# Patient Record
Sex: MALE | Race: WHITE | NOT HISPANIC OR LATINO | Employment: FULL TIME | URBAN - METROPOLITAN AREA
[De-identification: names, ages, dates, MRNs, and addresses within clinical notes are randomized per-mention and may not be internally consistent; named-entity substitution may affect disease eponyms.]

---

## 2023-09-07 PROBLEM — E55.9 VITAMIN D DEFICIENCY: Status: ACTIVE | Noted: 2023-09-07

## 2023-09-07 PROBLEM — M47.22 CERVICAL SPONDYLOSIS WITH RADICULOPATHY: Status: ACTIVE | Noted: 2023-09-07

## 2023-09-07 PROBLEM — E78.2 COMBINED HYPERLIPIDEMIA: Status: ACTIVE | Noted: 2023-09-07

## 2023-09-07 PROBLEM — N40.1 BENIGN PROSTATIC HYPERPLASIA WITH LOWER URINARY TRACT SYMPTOMS: Status: ACTIVE | Noted: 2023-09-07

## 2023-09-07 PROBLEM — G56.02 LEFT CARPAL TUNNEL SYNDROME: Status: ACTIVE | Noted: 2023-09-07

## 2023-09-07 PROBLEM — G62.9 PERIPHERAL NEUROPATHY: Status: ACTIVE | Noted: 2023-09-07

## 2023-09-07 PROBLEM — M54.16 LUMBAR RADICULITIS: Status: ACTIVE | Noted: 2023-09-07

## 2023-09-07 PROBLEM — Z86.711 HX OF PULMONARY EMBOLUS: Status: ACTIVE | Noted: 2023-09-07

## 2023-09-07 PROBLEM — M51.36 LUMBAR DEGENERATIVE DISC DISEASE: Status: ACTIVE | Noted: 2023-09-07

## 2023-09-07 PROBLEM — M51.369 LUMBAR DEGENERATIVE DISC DISEASE: Status: ACTIVE | Noted: 2023-09-07

## 2023-09-07 PROBLEM — R09.81 CHRONIC NASAL CONGESTION: Status: ACTIVE | Noted: 2023-09-07

## 2023-09-07 PROBLEM — G56.01 RIGHT CARPAL TUNNEL SYNDROME: Status: ACTIVE | Noted: 2023-09-07

## 2023-09-07 PROBLEM — G47.33 OBSTRUCTIVE SLEEP APNEA SYNDROME: Status: ACTIVE | Noted: 2023-09-07

## 2023-09-07 PROBLEM — R73.01 IMPAIRED FASTING GLUCOSE: Status: ACTIVE | Noted: 2023-09-07

## 2023-09-07 PROBLEM — M96.1 LUMBAR POSTLAMINECTOMY SYNDROME: Status: ACTIVE | Noted: 2023-09-07

## 2023-09-07 PROBLEM — E53.8 VITAMIN B12 DEFICIENCY: Status: ACTIVE | Noted: 2023-09-07

## 2023-09-07 PROBLEM — R32 URINARY INCONTINENCE: Status: ACTIVE | Noted: 2023-09-07

## 2023-09-07 PROBLEM — I10 BENIGN HYPERTENSION: Status: ACTIVE | Noted: 2023-09-07

## 2023-09-07 PROBLEM — E66.9 OBESITY: Status: ACTIVE | Noted: 2023-09-07

## 2023-09-07 RX ORDER — SIMVASTATIN 10 MG/1
10 TABLET, FILM COATED ORAL EVERY EVENING
COMMUNITY
End: 2023-11-20 | Stop reason: WASHOUT

## 2023-09-07 RX ORDER — CELECOXIB 100 MG/1
CAPSULE ORAL
COMMUNITY
Start: 2019-03-01 | End: 2023-11-20 | Stop reason: WASHOUT

## 2023-09-07 RX ORDER — EZETIMIBE 10 MG/1
TABLET ORAL
COMMUNITY
Start: 2019-03-01 | End: 2023-11-20 | Stop reason: WASHOUT

## 2023-09-07 RX ORDER — TIZANIDINE HYDROCHLORIDE 4 MG/1
CAPSULE, GELATIN COATED ORAL
COMMUNITY
Start: 2019-03-01 | End: 2023-11-20 | Stop reason: WASHOUT

## 2023-09-07 RX ORDER — METFORMIN HYDROCHLORIDE 1000 MG/1
TABLET ORAL
COMMUNITY
Start: 2019-03-01 | End: 2023-11-20 | Stop reason: WASHOUT

## 2023-11-20 ENCOUNTER — OFFICE VISIT (OUTPATIENT)
Dept: PAIN MEDICINE | Facility: CLINIC | Age: 63
End: 2023-11-20
Payer: COMMERCIAL

## 2023-11-20 VITALS
DIASTOLIC BLOOD PRESSURE: 80 MMHG | BODY MASS INDEX: 31.81 KG/M2 | WEIGHT: 240 LBS | HEIGHT: 73 IN | RESPIRATION RATE: 18 BRPM | SYSTOLIC BLOOD PRESSURE: 118 MMHG | HEART RATE: 71 BPM

## 2023-11-20 DIAGNOSIS — M79.2 NEURALGIA: ICD-10-CM

## 2023-11-20 DIAGNOSIS — G57.11 MERALGIA PARESTHETICA OF RIGHT SIDE: ICD-10-CM

## 2023-11-20 DIAGNOSIS — M54.50 CHRONIC LOW BACK PAIN, UNSPECIFIED BACK PAIN LATERALITY, UNSPECIFIED WHETHER SCIATICA PRESENT: Primary | ICD-10-CM

## 2023-11-20 DIAGNOSIS — G89.29 CHRONIC LOW BACK PAIN, UNSPECIFIED BACK PAIN LATERALITY, UNSPECIFIED WHETHER SCIATICA PRESENT: Primary | ICD-10-CM

## 2023-11-20 DIAGNOSIS — E66.09 CLASS 1 OBESITY DUE TO EXCESS CALORIES WITHOUT SERIOUS COMORBIDITY WITH BODY MASS INDEX (BMI) OF 31.0 TO 31.9 IN ADULT: ICD-10-CM

## 2023-11-20 DIAGNOSIS — M96.1 POSTLAMINECTOMY SYNDROME: ICD-10-CM

## 2023-11-20 PROCEDURE — 99204 OFFICE O/P NEW MOD 45 MIN: CPT | Performed by: ANESTHESIOLOGY

## 2023-11-20 PROCEDURE — 3074F SYST BP LT 130 MM HG: CPT | Performed by: ANESTHESIOLOGY

## 2023-11-20 PROCEDURE — 99214 OFFICE O/P EST MOD 30 MIN: CPT | Performed by: ANESTHESIOLOGY

## 2023-11-20 PROCEDURE — 3008F BODY MASS INDEX DOCD: CPT | Performed by: ANESTHESIOLOGY

## 2023-11-20 PROCEDURE — 1036F TOBACCO NON-USER: CPT | Performed by: ANESTHESIOLOGY

## 2023-11-20 PROCEDURE — 99401 PREV MED CNSL INDIV APPRX 15: CPT | Performed by: ANESTHESIOLOGY

## 2023-11-20 PROCEDURE — 3079F DIAST BP 80-89 MM HG: CPT | Performed by: ANESTHESIOLOGY

## 2023-11-20 RX ORDER — LIDOCAINE 50 MG/G
OINTMENT TOPICAL EVERY 12 HOURS
COMMUNITY
Start: 2023-07-25

## 2023-11-20 RX ORDER — LIDOCAINE 50 MG/G
1 PATCH CUTANEOUS DAILY
COMMUNITY
Start: 2023-07-24

## 2023-11-20 ASSESSMENT — PAIN SCALES - GENERAL: PAINLEVEL_OUTOF10: 1

## 2023-11-20 ASSESSMENT — PAIN DESCRIPTION - DESCRIPTORS: DESCRIPTORS: SHARP;SHOOTING;PRESSURE

## 2023-11-20 ASSESSMENT — ENCOUNTER SYMPTOMS
CONSTITUTIONAL NEGATIVE: 1
WEAKNESS: 1
EYES NEGATIVE: 1
RESPIRATORY NEGATIVE: 1
ENDOCRINE NEGATIVE: 1
PSYCHIATRIC NEGATIVE: 1
CARDIOVASCULAR NEGATIVE: 1
GASTROINTESTINAL NEGATIVE: 1
BACK PAIN: 1
HEMATOLOGIC/LYMPHATIC NEGATIVE: 1
NUMBNESS: 1

## 2023-11-20 ASSESSMENT — PATIENT HEALTH QUESTIONNAIRE - PHQ9
SUM OF ALL RESPONSES TO PHQ9 QUESTIONS 1 AND 2: 0
2. FEELING DOWN, DEPRESSED OR HOPELESS: NOT AT ALL
1. LITTLE INTEREST OR PLEASURE IN DOING THINGS: NOT AT ALL

## 2023-11-20 ASSESSMENT — PAIN - FUNCTIONAL ASSESSMENT: PAIN_FUNCTIONAL_ASSESSMENT: 0-10

## 2023-11-20 NOTE — H&P (VIEW-ONLY)
PAIN MANAGEMENT NEW PATIENT OFFICE NOTE    Date of Service: 11/20/2023    SUBJECTIVE    CHIEF COMPLAINT: LBP    HISTORY OF PRESENT ILLNESS    John Delatorre is a 63 y.o. male with PMH obesity, HTN, CHON non-adherent to CPAP, pDM2 who presents as new patient referred by Maurice Espinosa DO with LB pain.    Pt describes LBP 25 y without inciting trauma/incident s/p L3-S1 lami 2000, which resolved most of his LE radicular pain. Pain radiates to L side of low back and into L testicle; notes R lateral thigh numbness with standing/walking. Pain is worst with leaning forward/bending. . Pt has tried Tylenol, NSAIDs, tizanidine, MDP, >6 w PT with minimal relief. GIA (last 2019 with good relief). Last MRI L-spine 2/20/19.    Pt denies new-onset numbness, weakness, bowel/bladder incontinence.  Pt denies recent infection, allergy to Latex/iodine/contrast. Patient is currently taking the following blood thinner(s): N/A    REVIEW OF SYSTEMS  Review of Systems   Constitutional: Negative.    HENT: Negative.     Eyes: Negative.    Respiratory: Negative.     Cardiovascular: Negative.    Gastrointestinal: Negative.    Endocrine: Negative.    Musculoskeletal:  Positive for back pain.   Skin: Negative.    Neurological:  Positive for weakness and numbness.   Hematological: Negative.    Psychiatric/Behavioral: Negative.         PAST MEDICAL HISTORY  Past Medical History:   Diagnosis Date    Personal history of other endocrine, nutritional and metabolic disease 03/01/2019    History of diabetes mellitus    Personal history of other endocrine, nutritional and metabolic disease 03/01/2019    History of hyperlipidemia     Past Surgical History:   Procedure Laterality Date    ABDOMINAL SURGERY      OTHER SURGICAL HISTORY  03/01/2019    Back surgery     Family History   Problem Relation Name Age of Onset    Hyperlipidemia Mother      Hypertension Mother      Stroke Mother          CVA's    Skin cancer Mother      Diabetes Father      Heart  "disease Father      Leukemia Father          Acute myeloid leukemia    Hypertension Sister      Hyperlipidemia Sister      Diabetes Sister      Obesity Sister         CURRENT MEDICATIONS  Current Outpatient Medications   Medication Sig Dispense Refill    lidocaine (Xylocaine) 5 % ointment Apply topically every 12 hours.      Lidoderm 5 % patch Place 1 patch on the skin once daily.       No current facility-administered medications for this visit.       ALLERGIES AND DRUG REACTIONS  Allergies   Allergen Reactions    Atorvastatin Other     Abdominal pain     Gemfibrozil Unknown    Niacin Other     Rectal bleeding     Rosuvastatin Other     Abdominal pain           OBJECTIVE  Visit Vitals  /80   Pulse 71   Resp 18   Ht 1.854 m (6' 1\")   Wt 109 kg (240 lb)   BMI 31.66 kg/m²   Smoking Status Never   BSA 2.37 m²       Last Recorded Pain Score (if available):                Physical Exam  General: Sitting in chair, NAD  Head: NCAT  Eyes: Sclera/conjunctiva clear, EOMI, PERRL  Nose/mouth: MMM  CV: Good distal pulses  Lungs: Good/equal chest excursion  Abdomen: Soft, ND  Ext: No cyanosis/edema  MSK: L-spine alignment: WNL, BL paraspinal m NTTP, L-spine ROM: full  Neuro: AAOx3   Dermatome sensation to light touch  LEFT L1 (lower pelvis/upper thigh): WNL    RIGHT L1: decreased in distribution of LFCN      LEFT L2 (upper thigh): WNL       RIGHT: L2: decreased in distribution of LFCN      LEFT L3 (medial knee): WNL       RIGHT L3: decreased      LEFT L4 (superior medial malleolus): WNL       RIGHT L4: WNL      LEFT L5 (dorsal foot): WNL       RIGHT L5: WNL      LEFT S1 (lateral foot): WNL     RIGHT S1: WNL      LEFT S2 (popliteal fossa): WNL    RIGHT S2: WNL        Motor strength  LEFT L2 (hip flexion): 5/5   RIGHT L2: 5/5  LEFT L3 (knee extension): 5/5     RIGHT L3: 5/5  LEFT L4 (dorsiflexion): 5/5     RIGHT L4: 5/5  LEFT L5 (EHL extension): 5/5     RIGHT L5: 5/5  LEFT S1 (plantarflexion): 5/5     RIGHT S1: 5/5  LEFT S2 " "(knee flexion): 5/5      RIGHT S2: 5/5    Special testing  DTR unremarkable  Seated slump test neg BL  Clonus: neg BL  Babinski: neg BL    Psych: affect nl  Skin: no rash/lesions      REVIEW OF LABORATORY DATA  I have reviewed the following lab results:  WBC   Date Value Ref Range Status   02/20/2023 6.8 4.4 - 11.3 x10E9/L Final     RBC   Date Value Ref Range Status   02/20/2023 5.42 4.50 - 5.90 x10E12/L Final     Hemoglobin   Date Value Ref Range Status   02/20/2023 15.6 13.5 - 17.5 g/dL Final     Hematocrit   Date Value Ref Range Status   02/20/2023 48.8 41.0 - 52.0 % Final     MCV   Date Value Ref Range Status   02/20/2023 90 80 - 100 fL Final     MCH   Date Value Ref Range Status   01/16/2019 29.5 26 - 34 PG Final     MCHC   Date Value Ref Range Status   02/20/2023 32.0 32.0 - 36.0 g/dL Final     RDW   Date Value Ref Range Status   02/20/2023 13.3 11.5 - 14.5 % Final     Platelets   Date Value Ref Range Status   02/20/2023 320 150 - 450 x10E9/L Final     MPV   Date Value Ref Range Status   01/16/2019 9.5 7.0 - 12.6 CU Final     Sodium   Date Value Ref Range Status   02/20/2023 140 136 - 145 mmol/L Final     Potassium   Date Value Ref Range Status   02/20/2023 4.9 3.5 - 5.3 mmol/L Final     Bicarbonate   Date Value Ref Range Status   02/20/2023 30 21 - 32 mmol/L Final     Urea Nitrogen   Date Value Ref Range Status   02/20/2023 14 6 - 23 mg/dL Final     Calcium   Date Value Ref Range Status   02/20/2023 9.9 8.6 - 10.3 mg/dL Final     No results found for: \"PROTIME\", \"PTT\", \"INR\", \"FIBRINOGEN\"      REVIEW OF RADIOLOGY   I have reviewed the following:  Radiology Studies           MRI L-spine 2/20/19:  Patient is status post L3-L4, L4-L5 and L5-S1 laminectomy. There is  minimal retrolisthesis of L3 over L4 and L5 over S1.     There is loss of disc height at several levels suggestive of  desiccation and degenerative changes. There are mild endplate  irregularities and small Schmorl's nodes at multiple levels. " The  vertebral bodies demonstrate expected height. There are chronic  degenerative marrow signal changes at multiple levels.     The lower thoracic cord is unremarkable. The conus terminates  appropriately at L1-L2.     At L5-S1, patient is status post laminectomy. There is mild bilateral  facet hypertrophy. There is mild narrowing of bilateral lateral  recesses with moderate bilateral neural foraminal narrowing.     At L4-L5, there is mild bilateral facet hypertrophy with mild  encroachment upon bilateral lateral recesses. Patient is status post  laminectomy. There is mild posterior osteophytic spurring. There is  mild-to-moderate bilateral neural foraminal narrowing.     At L3-L4, there is prominent posterior osteophytic spurring with  bilateral facet hypertrophy. Patient is status post laminectomy.  There is no central canal stenosis. There is mild to moderate right  and mild left neural foraminal narrowing.     At L2-L3, there is posterior disc bulge, asymmetric to the right with  bilateral facet and ligamentum flavum hypertrophy. There is no  overall central canal stenosis. There is mild right neural foraminal  narrowing.     At L1-L2, there is posterior disc bulge with posterior osteophytic  spurring and bilateral facet hypertrophy. There is mild overall  central canal stenosis. There is no neural foraminal narrowing..     At T12-L1, there is mild posterior disc bulge with mild bilateral  facet hypertrophy without central canal stenosis or neural foramina  narrowing.     At T11-T12, there is posterior osteophytic spurring with posterior  disc bulge and bilateral facet hypertrophy. There is mild overall  central canal stenosis without significant neural foraminal narrowing.     There is volume loss of the posterior paraspinous muscles.     Mild edema is noted within the right psoas muscle at L2-L3 level.  Minimal edema is noted within the right posterior paraspinous muscles  at L5-S1 level.      IMPRESSION:  Status post laminectomy at L3-L4 to L5-S1 levels.     Lumbar spondylosis with varying severity of neural foramina  encroachment at multiple levels as detailed.         ASSESSMENT & PLAN  John Delatorre is a 63 y.o. old male with PMH obesity, HTN, CHON non-adherent to CPAP, pDM2 who presents as new patient referred by Maurice Espinosa,  with LBP radiating to L groin assoc with R thigh numbness most c/w lumbar PLS    1) Lumbar PLS after L3-S1 lami 2000  -Relieved his BLE pain, but worsening LBP  -Refractive to yrs of conservative tx including Tylenol, NSAIDs, tizanidine, MDP, >6 w PT   -Reviewed/discussed MRI L-spine 2/20/19: L3-S1 lami with L2-3 disc bulge/LFH contributing to R NFS  -MRI L-spine to eval progression in neuraxial pathology  -Last GIA 2019 by Dr Osorio supposedly gave excellent relief. Pt interested in repeating    2) R meralgia paresthetica  -Objective numbness in distribution of R LFCN likely 2/2 obesity  -Schedule diagnostic/therapeutic R LFCNB under US    3) Obesity  Discussed pt's obesity: BMI 31.6. Discussed its potential affect on worsening chronic pain through mechanical stress as well as neuro-inflammatory chemicals. This is in addition to contribution to metabolic syndrome and overal health and perioperative risk. Counseled on wt loss strategy.        Discussed procedure risks/benefits in detail with patient. Pt meets medical necessity for procedure due to failure of conservative measures. Reviewed procedural risks including bleeding, infection, nerve damage, paralysis. Also reviewed mitigating factors such as screening for infection/blood thinner use, sterile precautions, and image-guidance when applicable. All questions answered. Pt/guardian expressed understanding and choose to proceed           Gail Chow MD  Anesthesiologist & Interventional Pain Physician   [facility] [place]  O: [phone]  F: [phone]  3:10 PM  11/20/23

## 2023-11-20 NOTE — PROGRESS NOTES
PAIN MANAGEMENT NEW PATIENT OFFICE NOTE    Date of Service: 11/20/2023    SUBJECTIVE    CHIEF COMPLAINT: LBP    HISTORY OF PRESENT ILLNESS    John Delatorre is a 63 y.o. male with PMH obesity, HTN, CHON non-adherent to CPAP, pDM2 who presents as new patient referred by Maurice Espinosa DO with LB pain.    Pt describes LBP 25 y without inciting trauma/incident s/p L3-S1 lami 2000, which resolved most of his LE radicular pain. Pain radiates to L side of low back and into L testicle; notes R lateral thigh numbness with standing/walking. Pain is worst with leaning forward/bending. . Pt has tried Tylenol, NSAIDs, tizanidine, MDP, >6 w PT with minimal relief. GIA (last 2019 with good relief). Last MRI L-spine 2/20/19.    Pt denies new-onset numbness, weakness, bowel/bladder incontinence.  Pt denies recent infection, allergy to Latex/iodine/contrast. Patient is currently taking the following blood thinner(s): N/A    REVIEW OF SYSTEMS  Review of Systems   Constitutional: Negative.    HENT: Negative.     Eyes: Negative.    Respiratory: Negative.     Cardiovascular: Negative.    Gastrointestinal: Negative.    Endocrine: Negative.    Musculoskeletal:  Positive for back pain.   Skin: Negative.    Neurological:  Positive for weakness and numbness.   Hematological: Negative.    Psychiatric/Behavioral: Negative.         PAST MEDICAL HISTORY  Past Medical History:   Diagnosis Date    Personal history of other endocrine, nutritional and metabolic disease 03/01/2019    History of diabetes mellitus    Personal history of other endocrine, nutritional and metabolic disease 03/01/2019    History of hyperlipidemia     Past Surgical History:   Procedure Laterality Date    ABDOMINAL SURGERY      OTHER SURGICAL HISTORY  03/01/2019    Back surgery     Family History   Problem Relation Name Age of Onset    Hyperlipidemia Mother      Hypertension Mother      Stroke Mother          CVA's    Skin cancer Mother      Diabetes Father      Heart  "disease Father      Leukemia Father          Acute myeloid leukemia    Hypertension Sister      Hyperlipidemia Sister      Diabetes Sister      Obesity Sister         CURRENT MEDICATIONS  Current Outpatient Medications   Medication Sig Dispense Refill    lidocaine (Xylocaine) 5 % ointment Apply topically every 12 hours.      Lidoderm 5 % patch Place 1 patch on the skin once daily.       No current facility-administered medications for this visit.       ALLERGIES AND DRUG REACTIONS  Allergies   Allergen Reactions    Atorvastatin Other     Abdominal pain     Gemfibrozil Unknown    Niacin Other     Rectal bleeding     Rosuvastatin Other     Abdominal pain           OBJECTIVE  Visit Vitals  /80   Pulse 71   Resp 18   Ht 1.854 m (6' 1\")   Wt 109 kg (240 lb)   BMI 31.66 kg/m²   Smoking Status Never   BSA 2.37 m²       Last Recorded Pain Score (if available):                Physical Exam  General: Sitting in chair, NAD  Head: NCAT  Eyes: Sclera/conjunctiva clear, EOMI, PERRL  Nose/mouth: MMM  CV: Good distal pulses  Lungs: Good/equal chest excursion  Abdomen: Soft, ND  Ext: No cyanosis/edema  MSK: L-spine alignment: WNL, BL paraspinal m NTTP, L-spine ROM: full  Neuro: AAOx3   Dermatome sensation to light touch  LEFT L1 (lower pelvis/upper thigh): WNL    RIGHT L1: decreased in distribution of LFCN      LEFT L2 (upper thigh): WNL       RIGHT: L2: decreased in distribution of LFCN      LEFT L3 (medial knee): WNL       RIGHT L3: decreased      LEFT L4 (superior medial malleolus): WNL       RIGHT L4: WNL      LEFT L5 (dorsal foot): WNL       RIGHT L5: WNL      LEFT S1 (lateral foot): WNL     RIGHT S1: WNL      LEFT S2 (popliteal fossa): WNL    RIGHT S2: WNL        Motor strength  LEFT L2 (hip flexion): 5/5   RIGHT L2: 5/5  LEFT L3 (knee extension): 5/5     RIGHT L3: 5/5  LEFT L4 (dorsiflexion): 5/5     RIGHT L4: 5/5  LEFT L5 (EHL extension): 5/5     RIGHT L5: 5/5  LEFT S1 (plantarflexion): 5/5     RIGHT S1: 5/5  LEFT S2 " "(knee flexion): 5/5      RIGHT S2: 5/5    Special testing  DTR unremarkable  Seated slump test neg BL  Clonus: neg BL  Babinski: neg BL    Psych: affect nl  Skin: no rash/lesions      REVIEW OF LABORATORY DATA  I have reviewed the following lab results:  WBC   Date Value Ref Range Status   02/20/2023 6.8 4.4 - 11.3 x10E9/L Final     RBC   Date Value Ref Range Status   02/20/2023 5.42 4.50 - 5.90 x10E12/L Final     Hemoglobin   Date Value Ref Range Status   02/20/2023 15.6 13.5 - 17.5 g/dL Final     Hematocrit   Date Value Ref Range Status   02/20/2023 48.8 41.0 - 52.0 % Final     MCV   Date Value Ref Range Status   02/20/2023 90 80 - 100 fL Final     MCH   Date Value Ref Range Status   01/16/2019 29.5 26 - 34 PG Final     MCHC   Date Value Ref Range Status   02/20/2023 32.0 32.0 - 36.0 g/dL Final     RDW   Date Value Ref Range Status   02/20/2023 13.3 11.5 - 14.5 % Final     Platelets   Date Value Ref Range Status   02/20/2023 320 150 - 450 x10E9/L Final     MPV   Date Value Ref Range Status   01/16/2019 9.5 7.0 - 12.6 CU Final     Sodium   Date Value Ref Range Status   02/20/2023 140 136 - 145 mmol/L Final     Potassium   Date Value Ref Range Status   02/20/2023 4.9 3.5 - 5.3 mmol/L Final     Bicarbonate   Date Value Ref Range Status   02/20/2023 30 21 - 32 mmol/L Final     Urea Nitrogen   Date Value Ref Range Status   02/20/2023 14 6 - 23 mg/dL Final     Calcium   Date Value Ref Range Status   02/20/2023 9.9 8.6 - 10.3 mg/dL Final     No results found for: \"PROTIME\", \"PTT\", \"INR\", \"FIBRINOGEN\"      REVIEW OF RADIOLOGY   I have reviewed the following:  Radiology Studies           MRI L-spine 2/20/19:  Patient is status post L3-L4, L4-L5 and L5-S1 laminectomy. There is  minimal retrolisthesis of L3 over L4 and L5 over S1.     There is loss of disc height at several levels suggestive of  desiccation and degenerative changes. There are mild endplate  irregularities and small Schmorl's nodes at multiple levels. " The  vertebral bodies demonstrate expected height. There are chronic  degenerative marrow signal changes at multiple levels.     The lower thoracic cord is unremarkable. The conus terminates  appropriately at L1-L2.     At L5-S1, patient is status post laminectomy. There is mild bilateral  facet hypertrophy. There is mild narrowing of bilateral lateral  recesses with moderate bilateral neural foraminal narrowing.     At L4-L5, there is mild bilateral facet hypertrophy with mild  encroachment upon bilateral lateral recesses. Patient is status post  laminectomy. There is mild posterior osteophytic spurring. There is  mild-to-moderate bilateral neural foraminal narrowing.     At L3-L4, there is prominent posterior osteophytic spurring with  bilateral facet hypertrophy. Patient is status post laminectomy.  There is no central canal stenosis. There is mild to moderate right  and mild left neural foraminal narrowing.     At L2-L3, there is posterior disc bulge, asymmetric to the right with  bilateral facet and ligamentum flavum hypertrophy. There is no  overall central canal stenosis. There is mild right neural foraminal  narrowing.     At L1-L2, there is posterior disc bulge with posterior osteophytic  spurring and bilateral facet hypertrophy. There is mild overall  central canal stenosis. There is no neural foraminal narrowing..     At T12-L1, there is mild posterior disc bulge with mild bilateral  facet hypertrophy without central canal stenosis or neural foramina  narrowing.     At T11-T12, there is posterior osteophytic spurring with posterior  disc bulge and bilateral facet hypertrophy. There is mild overall  central canal stenosis without significant neural foraminal narrowing.     There is volume loss of the posterior paraspinous muscles.     Mild edema is noted within the right psoas muscle at L2-L3 level.  Minimal edema is noted within the right posterior paraspinous muscles  at L5-S1 level.      IMPRESSION:  Status post laminectomy at L3-L4 to L5-S1 levels.     Lumbar spondylosis with varying severity of neural foramina  encroachment at multiple levels as detailed.         ASSESSMENT & PLAN  John Delatorre is a 63 y.o. old male with PMH obesity, HTN, CHON non-adherent to CPAP, pDM2 who presents as new patient referred by Maurice Espinosa,  with LBP radiating to L groin assoc with R thigh numbness most c/w lumbar PLS    1) Lumbar PLS after L3-S1 lami 2000  -Relieved his BLE pain, but worsening LBP  -Refractive to yrs of conservative tx including Tylenol, NSAIDs, tizanidine, MDP, >6 w PT   -Reviewed/discussed MRI L-spine 2/20/19: L3-S1 lami with L2-3 disc bulge/LFH contributing to R NFS  -MRI L-spine to eval progression in neuraxial pathology  -Last GIA 2019 by Dr Osorio supposedly gave excellent relief. Pt interested in repeating    2) R meralgia paresthetica  -Objective numbness in distribution of R LFCN likely 2/2 obesity  -Schedule diagnostic/therapeutic R LFCNB under US    3) Obesity  Discussed pt's obesity: BMI 31.6. Discussed its potential affect on worsening chronic pain through mechanical stress as well as neuro-inflammatory chemicals. This is in addition to contribution to metabolic syndrome and overal health and perioperative risk. Counseled on wt loss strategy.        Discussed procedure risks/benefits in detail with patient. Pt meets medical necessity for procedure due to failure of conservative measures. Reviewed procedural risks including bleeding, infection, nerve damage, paralysis. Also reviewed mitigating factors such as screening for infection/blood thinner use, sterile precautions, and image-guidance when applicable. All questions answered. Pt/guardian expressed understanding and choose to proceed           Gail Chow MD  Anesthesiologist & Interventional Pain Physician   [facility] [place]  O: [phone]  F: [phone]  3:10 PM  11/20/23

## 2023-11-20 NOTE — LETTER
November 20, 2023     Maurice Espinosa DO  [address]  [address]  [facility] [address]    Patient: John Delatorre   YOB: 1960   Date of Visit: 11/20/2023       Dear Dr. Maurice Espinosa DO:    Thank you for referring John Delatorre to me for evaluation. Below are my notes for this consultation.  If you have questions, please do not hesitate to call me. I look forward to following your patient along with you.       Sincerely,     Gail Chow MD      CC: No Recipients  ______________________________________________________________________________________    PAIN MANAGEMENT NEW PATIENT OFFICE NOTE    Date of Service: 11/20/2023    SUBJECTIVE    CHIEF COMPLAINT: LBP    HISTORY OF PRESENT ILLNESS    John Delatorre is a 63 y.o. male with PMH obesity, HTN, CHON non-adherent to CPAP, pDM2 who presents as new patient referred by Maurice Espinosa DO with LB pain.    Pt describes LBP 25 y without inciting trauma/incident s/p L3-S1 lami 2000, which resolved most of his LE radicular pain. Pain radiates to L side of low back and into L testicle; notes R lateral thigh numbness with standing/walking. Pain is worst with leaning forward/bending. . Pt has tried Tylenol, NSAIDs, tizanidine, MDP, >6 w PT with minimal relief. GIA (last 2019 with good relief). Last MRI L-spine 2/20/19.    Pt denies new-onset numbness, weakness, bowel/bladder incontinence.  Pt denies recent infection, allergy to Latex/iodine/contrast. Patient is currently taking the following blood thinner(s): N/A    REVIEW OF SYSTEMS  Review of Systems   Constitutional: Negative.    HENT: Negative.     Eyes: Negative.    Respiratory: Negative.     Cardiovascular: Negative.    Gastrointestinal: Negative.    Endocrine: Negative.    Musculoskeletal:  Positive for back pain.   Skin: Negative.    Neurological:  Positive for weakness and numbness.   Hematological: Negative.    Psychiatric/Behavioral: Negative.         PAST MEDICAL HISTORY  Past Medical History:  "  Diagnosis Date   • Personal history of other endocrine, nutritional and metabolic disease 03/01/2019    History of diabetes mellitus   • Personal history of other endocrine, nutritional and metabolic disease 03/01/2019    History of hyperlipidemia     Past Surgical History:   Procedure Laterality Date   • ABDOMINAL SURGERY     • OTHER SURGICAL HISTORY  03/01/2019    Back surgery     Family History   Problem Relation Name Age of Onset   • Hyperlipidemia Mother     • Hypertension Mother     • Stroke Mother          CVA's   • Skin cancer Mother     • Diabetes Father     • Heart disease Father     • Leukemia Father          Acute myeloid leukemia   • Hypertension Sister     • Hyperlipidemia Sister     • Diabetes Sister     • Obesity Sister         CURRENT MEDICATIONS  Current Outpatient Medications   Medication Sig Dispense Refill   • lidocaine (Xylocaine) 5 % ointment Apply topically every 12 hours.     • Lidoderm 5 % patch Place 1 patch on the skin once daily.       No current facility-administered medications for this visit.       ALLERGIES AND DRUG REACTIONS  Allergies   Allergen Reactions   • Atorvastatin Other     Abdominal pain    • Gemfibrozil Unknown   • Niacin Other     Rectal bleeding    • Rosuvastatin Other     Abdominal pain           OBJECTIVE  Visit Vitals  /80   Pulse 71   Resp 18   Ht 1.854 m (6' 1\")   Wt 109 kg (240 lb)   BMI 31.66 kg/m²   Smoking Status Never   BSA 2.37 m²       Last Recorded Pain Score (if available):                Physical Exam  General: Sitting in chair, NAD  Head: NCAT  Eyes: Sclera/conjunctiva clear, EOMI, PERRL  Nose/mouth: MMM  CV: Good distal pulses  Lungs: Good/equal chest excursion  Abdomen: Soft, ND  Ext: No cyanosis/edema  MSK: L-spine alignment: WNL, BL paraspinal m NTTP, L-spine ROM: full  Neuro: AAOx3   Dermatome sensation to light touch  LEFT L1 (lower pelvis/upper thigh): WNL    RIGHT L1: decreased in distribution of LFCN      LEFT L2 (upper thigh): WNL       " RIGHT: L2: decreased in distribution of LFCN      LEFT L3 (medial knee): WNL       RIGHT L3: decreased      LEFT L4 (superior medial malleolus): WNL       RIGHT L4: WNL      LEFT L5 (dorsal foot): WNL       RIGHT L5: WNL      LEFT S1 (lateral foot): WNL     RIGHT S1: WNL      LEFT S2 (popliteal fossa): WNL    RIGHT S2: WNL        Motor strength  LEFT L2 (hip flexion): 5/5   RIGHT L2: 5/5  LEFT L3 (knee extension): 5/5     RIGHT L3: 5/5  LEFT L4 (dorsiflexion): 5/5     RIGHT L4: 5/5  LEFT L5 (EHL extension): 5/5     RIGHT L5: 5/5  LEFT S1 (plantarflexion): 5/5     RIGHT S1: 5/5  LEFT S2 (knee flexion): 5/5      RIGHT S2: 5/5    Special testing  DTR unremarkable  Seated slump test neg BL  Clonus: neg BL  Babinski: neg BL    Psych: affect nl  Skin: no rash/lesions      REVIEW OF LABORATORY DATA  I have reviewed the following lab results:  WBC   Date Value Ref Range Status   02/20/2023 6.8 4.4 - 11.3 x10E9/L Final     RBC   Date Value Ref Range Status   02/20/2023 5.42 4.50 - 5.90 x10E12/L Final     Hemoglobin   Date Value Ref Range Status   02/20/2023 15.6 13.5 - 17.5 g/dL Final     Hematocrit   Date Value Ref Range Status   02/20/2023 48.8 41.0 - 52.0 % Final     MCV   Date Value Ref Range Status   02/20/2023 90 80 - 100 fL Final     MCH   Date Value Ref Range Status   01/16/2019 29.5 26 - 34 PG Final     MCHC   Date Value Ref Range Status   02/20/2023 32.0 32.0 - 36.0 g/dL Final     RDW   Date Value Ref Range Status   02/20/2023 13.3 11.5 - 14.5 % Final     Platelets   Date Value Ref Range Status   02/20/2023 320 150 - 450 x10E9/L Final     MPV   Date Value Ref Range Status   01/16/2019 9.5 7.0 - 12.6 CU Final     Sodium   Date Value Ref Range Status   02/20/2023 140 136 - 145 mmol/L Final     Potassium   Date Value Ref Range Status   02/20/2023 4.9 3.5 - 5.3 mmol/L Final     Bicarbonate   Date Value Ref Range Status   02/20/2023 30 21 - 32 mmol/L Final     Urea Nitrogen   Date Value Ref Range Status   02/20/2023 14  "6 - 23 mg/dL Final     Calcium   Date Value Ref Range Status   02/20/2023 9.9 8.6 - 10.3 mg/dL Final     No results found for: \"PROTIME\", \"PTT\", \"INR\", \"FIBRINOGEN\"      REVIEW OF RADIOLOGY   I have reviewed the following:  Radiology Studies           MRI L-spine 2/20/19:  Patient is status post L3-L4, L4-L5 and L5-S1 laminectomy. There is  minimal retrolisthesis of L3 over L4 and L5 over S1.     There is loss of disc height at several levels suggestive of  desiccation and degenerative changes. There are mild endplate  irregularities and small Schmorl's nodes at multiple levels. The  vertebral bodies demonstrate expected height. There are chronic  degenerative marrow signal changes at multiple levels.     The lower thoracic cord is unremarkable. The conus terminates  appropriately at L1-L2.     At L5-S1, patient is status post laminectomy. There is mild bilateral  facet hypertrophy. There is mild narrowing of bilateral lateral  recesses with moderate bilateral neural foraminal narrowing.     At L4-L5, there is mild bilateral facet hypertrophy with mild  encroachment upon bilateral lateral recesses. Patient is status post  laminectomy. There is mild posterior osteophytic spurring. There is  mild-to-moderate bilateral neural foraminal narrowing.     At L3-L4, there is prominent posterior osteophytic spurring with  bilateral facet hypertrophy. Patient is status post laminectomy.  There is no central canal stenosis. There is mild to moderate right  and mild left neural foraminal narrowing.     At L2-L3, there is posterior disc bulge, asymmetric to the right with  bilateral facet and ligamentum flavum hypertrophy. There is no  overall central canal stenosis. There is mild right neural foraminal  narrowing.     At L1-L2, there is posterior disc bulge with posterior osteophytic  spurring and bilateral facet hypertrophy. There is mild overall  central canal stenosis. There is no neural foraminal narrowing..     At T12-L1, " there is mild posterior disc bulge with mild bilateral  facet hypertrophy without central canal stenosis or neural foramina  narrowing.     At T11-T12, there is posterior osteophytic spurring with posterior  disc bulge and bilateral facet hypertrophy. There is mild overall  central canal stenosis without significant neural foraminal narrowing.     There is volume loss of the posterior paraspinous muscles.     Mild edema is noted within the right psoas muscle at L2-L3 level.  Minimal edema is noted within the right posterior paraspinous muscles  at L5-S1 level.     IMPRESSION:  Status post laminectomy at L3-L4 to L5-S1 levels.     Lumbar spondylosis with varying severity of neural foramina  encroachment at multiple levels as detailed.         ASSESSMENT & PLAN  John Delatorre is a 63 y.o. old male with PMH obesity, HTN, CHON non-adherent to CPAP, pDM2 who presents as new patient referred by Maurice Espinosa DO with LBP radiating to L groin assoc with R thigh numbness most c/w lumbar PLS    1) Lumbar PLS after L3-S1 lami 2000  -Relieved his BLE pain, but worsening LBP  -Refractive to yrs of conservative tx including Tylenol, NSAIDs, tizanidine, MDP, >6 w PT   -Reviewed/discussed MRI L-spine 2/20/19: L3-S1 lami with L2-3 disc bulge/LFH contributing to R NFS  -MRI L-spine to eval progression in neuraxial pathology  -Last GIA 2019 by Dr Osorio supposedly gave excellent relief. Pt interested in repeating    2) R meralgia paresthetica  -Objective numbness in distribution of R LFCN likely 2/2 obesity  -Schedule diagnostic/therapeutic R LFCNB under US    3) Obesity  Discussed pt's obesity: BMI 31.6. Discussed its potential affect on worsening chronic pain through mechanical stress as well as neuro-inflammatory chemicals. This is in addition to contribution to metabolic syndrome and overal health and perioperative risk. Counseled on wt loss strategy.        Discussed procedure risks/benefits in detail with patient. Pt meets  medical necessity for procedure due to failure of conservative measures. Reviewed procedural risks including bleeding, infection, nerve damage, paralysis. Also reviewed mitigating factors such as screening for infection/blood thinner use, sterile precautions, and image-guidance when applicable. All questions answered. Pt/guardian expressed understanding and choose to proceed           Gail Chow MD  Anesthesiologist & Interventional Pain Physician   [facility] [place]  O: [phone]  F: [phone]  3:10 PM  11/20/23

## 2023-11-21 ENCOUNTER — TELEPHONE (OUTPATIENT)
Dept: PAIN MEDICINE | Facility: CLINIC | Age: 63
End: 2023-11-21
Payer: COMMERCIAL

## 2023-11-30 ENCOUNTER — HOSPITAL ENCOUNTER (OUTPATIENT)
Dept: GASTROENTEROLOGY | Facility: HOSPITAL | Age: 63
Discharge: HOME | End: 2023-11-30
Payer: COMMERCIAL

## 2023-11-30 ENCOUNTER — HOSPITAL ENCOUNTER (OUTPATIENT)
Dept: RADIOLOGY | Facility: HOSPITAL | Age: 63
Discharge: HOME | End: 2023-11-30
Payer: COMMERCIAL

## 2023-11-30 ENCOUNTER — HOSPITAL ENCOUNTER (OUTPATIENT)
Dept: RADIOLOGY | Facility: HOSPITAL | Age: 63
End: 2023-11-30

## 2023-11-30 VITALS
TEMPERATURE: 96.8 F | RESPIRATION RATE: 16 BRPM | DIASTOLIC BLOOD PRESSURE: 68 MMHG | BODY MASS INDEX: 33.13 KG/M2 | HEIGHT: 73 IN | SYSTOLIC BLOOD PRESSURE: 122 MMHG | OXYGEN SATURATION: 98 % | HEART RATE: 68 BPM | WEIGHT: 250 LBS

## 2023-11-30 DIAGNOSIS — M79.2 NERVE PAIN: ICD-10-CM

## 2023-11-30 DIAGNOSIS — M79.2 NEURALGIA: ICD-10-CM

## 2023-11-30 DIAGNOSIS — G57.11 MERALGIA PARESTHETICA OF RIGHT SIDE: ICD-10-CM

## 2023-11-30 PROCEDURE — 94760 N-INVAS EAR/PLS OXIMETRY 1: CPT

## 2023-11-30 PROCEDURE — 76942 ECHO GUIDE FOR BIOPSY: CPT | Performed by: ANESTHESIOLOGY

## 2023-11-30 PROCEDURE — 64450 NJX AA&/STRD OTHER PN/BRANCH: CPT | Performed by: ANESTHESIOLOGY

## 2023-11-30 PROCEDURE — 76942 ECHO GUIDE FOR BIOPSY: CPT

## 2023-11-30 ASSESSMENT — PAIN SCALES - GENERAL
PAINLEVEL_OUTOF10: 1
PAINLEVEL_OUTOF10: 0 - NO PAIN

## 2023-11-30 ASSESSMENT — COLUMBIA-SUICIDE SEVERITY RATING SCALE - C-SSRS
2. HAVE YOU ACTUALLY HAD ANY THOUGHTS OF KILLING YOURSELF?: NO
6. HAVE YOU EVER DONE ANYTHING, STARTED TO DO ANYTHING, OR PREPARED TO DO ANYTHING TO END YOUR LIFE?: NO
1. IN THE PAST MONTH, HAVE YOU WISHED YOU WERE DEAD OR WISHED YOU COULD GO TO SLEEP AND NOT WAKE UP?: NO

## 2023-11-30 ASSESSMENT — PAIN DESCRIPTION - DESCRIPTORS: DESCRIPTORS: NUMBNESS;ACHING

## 2023-11-30 ASSESSMENT — PAIN - FUNCTIONAL ASSESSMENT: PAIN_FUNCTIONAL_ASSESSMENT: 0-10

## 2023-11-30 NOTE — INTERVAL H&P NOTE
H&P reviewed. The patient was examined and there are no changes to the H&P. Pt is a 63 y.o. male with PMH obesity, HTN, CHON non-adherent to CPAP, pDM2 who presents for diagnostic/therapeutic R LFCNB under US. Patient's pain stable and persistent from last visit.  Denies allergies to Latex, steroids, local anesthetics, or iodine/contrast. Not diabetic.        Gail Chow MD  Anesthesiologist & Interventional Pain Physician   [facility] [place]  O: [phone]  F: [phone]  10:01 AM  11/30/23

## 2023-11-30 NOTE — OP NOTE
"Procedure: right lateral femoral cutaneous nerve block    Informed consent obtained. Time-out performed. right anterior superior hip region prepped with alcohol. Ultrasound utilized to identify right lateral femoral cutaneous nerve in between fascia keyla and fascia iliaca, superior to junction of sartorius and tensor fascia keyla. 1.5\" 25 G needle inserted in-plane under ultrasound guidance until 1-2 mm from nerve. After negative aspiration, 5 ml injectate administered with adequate hydrodissection.     Injectate: 4 ml 0.5% bupivacaine + 10 mg dexamethasone    Needle withdrawn. Hemostasis achieved. Bandaid placed. Pt tolerated procedure without issue    Dispo: pt discharged home in stable condition.       Gail Chow MD  Anesthesiologist & Interventional Pain Physician   [facility] [place]  O: [phone]  F: [phone]  10:02 AM  11/30/23    "

## 2023-11-30 NOTE — DISCHARGE INSTRUCTIONS
DISCHARGEINSTRUCTIONS FOR INJECTIONS     You underwent right lateral femoral cutaneous nerve block today    Aftermost injections, it is recommended that you relax and limit your activity for the remainder of the day unless you have been told otherwise by your pain physician.  You should not drive a car, operate machinery, or make important legal decisions unless otherwise directed by your pain physician.  You may resume your normal activity, including exercise, tomorrow.      Keep a written pain diary of how much pain relief you experienced following the injection procedure and the length of time of pain relief you experienced pain relief. Following diagnostic injections like medial branch nerve blocks, sacroiliac joint blocks, stellate ganglion injections and other blocks, it is very important you record the specific amount of pain relief you experienced immediately after the injectionand how long it lasted. Your doctor will ask you for this information at your follow up visit.     For all injections, please keep the injection site dry and inspect the site for a couple of days. You may remove the Band-Aid the day of the injection at any time.     Some discomfort, bruising or slight swelling may occur at the injection site. This is not abnormal if it occurs.  If needed you may:    -Take over the counter medication such as Tylenol or Motrin.   -Apply an ice pack for 30 minutes, 2 to 3 times a day for the first 24 hours.     You may shower today; no soaking baths, hot tubs, whirlpools or swimming pools for two days.      If you are given steroids in your injection, it may take 3-5 days for the steroid medication to take effect. You may notice a worsening of your symptoms for 1-2 days after the injection. This is not abnormal.  You may use acetaminophen, ibuprofen, or prescription medication that your doctor may have prescribed for you if you need to do so.     A few common side effects of steroids include facial  flushing, sweating, restlessness, irritability,difficulty sleeping, increase in blood sugar, and increased blood pressure. If you have diabetes, please monitor your blood sugar at least once a day for at least 5 days. If you have poorly controlled high blood pressure, monitoryour blood pressure for at least 2 days and contact your primary care physician if these numbers are unusually high for you.      If you take aspirin or non-steroidal anti-inflammatory drugs (examples are Motrin, Advil, ibuprofen, Naprosyn, Voltaren, Relafen, etc.) you may restart these this evening, but stop taking it 3 days before your next appointment, unless instructed otherwiseby your physician.      You do not need to discontinue non-aspirin-containing pain medications prior to an injection (examples: Celebrex, tramadol, hydrocodone and acetaminophen).      If you take a blood thinning medication (Coumadin, Lovenox, Fragmin,Ticlid, Plavix, Pradaxa, etc.), please discuss this with your primary care physician/cardiologist and your pain physician. These medications MUST be discontinued before you can have an injection safely, without the risk of uncontrolled bleeding. If these medications are not discontinued for an appropriate period of time, you will not be able to receivean injection.      If you are taking Coumadin, please have your INR checked the morning of your procedure and bringthe result to your appointment unless otherwise instructed. If your INR is over 1.2, your injection may need to be rescheduled to avoid uncontrolled bleeding from the needle placement.     Call [facility] at [phone] between 8am-4pm Monday - Friday if you are experiencing the following:    If you received an epidural or spinal injection:    -Headache that doesnot go away with medicine, is worse when sitting or standing up, and is greatly relieved upon lying down.   -Severe pain worse than or different than your baseline pain.   -Chills  or fever (101º F or greater).   -Drainage or signs of infection at the injection site     Go directly to the Emergency Department if you are experiencing the following and received an epidural or spinal injection:   -Abrupt weakness or progressive weakness in your legs that starts after you leave the clinic.   -Abrupt severe or worsening numbness in your legs.   -Inability to urinate after the injection or loss of bowel or bladder control without the urge to defecate or urinate.     If you have a clinical question that cannot wait until your next appointment, please call [phone] between 8am-4pm Monday - Friday or send a [employer] message. We do our best to return all non-emergency messages within 24 hours, Monday - Friday. A nurse or physician will return your message.      If you need to cancel an appointment, please call the scheduling staff at [phone] during normal business hours or leave a message at least 24 hours in advance.     If you are going to be sedated for your next procedure, you MUST have responsible adult who can legally drive accompany you home. You cannot eat or drink for eight hours prior to the planned procedure if you are going to receive sedation. You may take your non-blood thinning medications with a small sip of water.

## 2023-11-30 NOTE — POST-PROCEDURE NOTE
1046-PATIENT RECEIVED FROM PROCEDURE ALERT AND ORIENTED. DENIES ANY C/O PAIN.  NO BAND-AID AT INJECTION SITE.  1050-PATIENT ABLE TO STAND AND AMBULATE. DISCHARGE INSTRUCTIONS REVIEWED WITH PATIENT.

## 2023-12-04 ENCOUNTER — OFFICE VISIT (OUTPATIENT)
Dept: PAIN MEDICINE | Facility: CLINIC | Age: 63
End: 2023-12-04
Payer: COMMERCIAL

## 2023-12-04 VITALS
HEART RATE: 70 BPM | DIASTOLIC BLOOD PRESSURE: 86 MMHG | SYSTOLIC BLOOD PRESSURE: 124 MMHG | WEIGHT: 249.6 LBS | BODY MASS INDEX: 33.08 KG/M2 | HEIGHT: 73 IN

## 2023-12-04 DIAGNOSIS — E66.09 CLASS 1 OBESITY DUE TO EXCESS CALORIES WITHOUT SERIOUS COMORBIDITY WITH BODY MASS INDEX (BMI) OF 31.0 TO 31.9 IN ADULT: Primary | ICD-10-CM

## 2023-12-04 DIAGNOSIS — Z13.29 SCREENING FOR ENDOCRINE, NUTRITIONAL, METABOLIC AND IMMUNITY DISORDER: ICD-10-CM

## 2023-12-04 DIAGNOSIS — Z13.0 SCREENING FOR ENDOCRINE, NUTRITIONAL, METABOLIC AND IMMUNITY DISORDER: ICD-10-CM

## 2023-12-04 DIAGNOSIS — Z71.3 DIETARY COUNSELING: ICD-10-CM

## 2023-12-04 DIAGNOSIS — Z13.21 SCREENING FOR ENDOCRINE, NUTRITIONAL, METABOLIC AND IMMUNITY DISORDER: ICD-10-CM

## 2023-12-04 DIAGNOSIS — Z71.82 EXERCISE COUNSELING: ICD-10-CM

## 2023-12-04 DIAGNOSIS — Z13.228 SCREENING FOR ENDOCRINE, NUTRITIONAL, METABOLIC AND IMMUNITY DISORDER: ICD-10-CM

## 2023-12-04 DIAGNOSIS — R73.03 PREDIABETES: ICD-10-CM

## 2023-12-04 DIAGNOSIS — R63.2 POLYPHAGIA: ICD-10-CM

## 2023-12-04 PROCEDURE — 99401 PREV MED CNSL INDIV APPRX 15: CPT | Performed by: NURSE PRACTITIONER

## 2023-12-04 PROCEDURE — 3008F BODY MASS INDEX DOCD: CPT | Performed by: NURSE PRACTITIONER

## 2023-12-04 PROCEDURE — 3079F DIAST BP 80-89 MM HG: CPT | Performed by: NURSE PRACTITIONER

## 2023-12-04 PROCEDURE — 1036F TOBACCO NON-USER: CPT | Performed by: NURSE PRACTITIONER

## 2023-12-04 PROCEDURE — 99215 OFFICE O/P EST HI 40 MIN: CPT | Performed by: NURSE PRACTITIONER

## 2023-12-04 PROCEDURE — 99214 OFFICE O/P EST MOD 30 MIN: CPT | Performed by: NURSE PRACTITIONER

## 2023-12-04 PROCEDURE — 3074F SYST BP LT 130 MM HG: CPT | Performed by: NURSE PRACTITIONER

## 2023-12-04 ASSESSMENT — PATIENT HEALTH QUESTIONNAIRE - PHQ9
1. LITTLE INTEREST OR PLEASURE IN DOING THINGS: NOT AT ALL
2. FEELING DOWN, DEPRESSED OR HOPELESS: NOT AT ALL
SUM OF ALL RESPONSES TO PHQ9 QUESTIONS 1 AND 2: 0

## 2023-12-04 ASSESSMENT — PAIN - FUNCTIONAL ASSESSMENT: PAIN_FUNCTIONAL_ASSESSMENT: 0-10

## 2023-12-04 ASSESSMENT — PAIN SCALES - GENERAL: PAINLEVEL_OUTOF10: 4

## 2023-12-04 NOTE — PROGRESS NOTES
"Subjective   Patient ID: John Delatorre \"Denilson\" is a 63 y.o. male who presents for Weight Management Consultation.     HPI 62 YO Male with Obesity presents for a Weight Management Consultation.     #Referring Provider: Dr. Chow    # Weight History:  Initial onset of obesity: 1985  Past Diet Attempts: Weight watchers  Diet with best results/success: no long-term success with past diet attempts.   Have not taken Prescription/OTC medications for weight loss.  Goal(s) for weight loss: to lose 50 lbs  Juice, pop or other high calorie beverages? Pop sometimes  Restaurant/Fast Food Frequency: once a week    # Anthropometric Measurements:  Highest Adult Weight: 249.4  Lowest Adult Weight: 185  Current Weight: 249.6  Current Height: 6'1\"  Waist Circumference: 48  Neck Circumference: 18    #Diet Recall:  Breakfast: none  Lunch: Spaghetti with meat balls        Dinner: jambalaya  Snack(s): banana    # Current Exercise Routine: None    Review of Systems Unless noted in the HPI all other systems have been reviewed and are negative for complaint    Objective   Physical Exam  General- No acute distress, well appearing and well nourished. Obese  Eyes Conjunctiva and lids: No erythema, swelling or discharge  Neck - Supple, no cervical lymphadenopathy.   Pulmonary - Respiratory effort: Normal respiration.   Cardiovascular - Normal rate and rhythm.  Examination of extremities for edema and/or varicosities: No peripheral edema  Abdomen: Soft, Non-tender, non-distended, no abdominal masses.   Musculoskeletal - Range of motion: normal  Skin - Skin and subcutaneous tissue: Normal without rashes or lesions.  Neurologic - Reflexes: Normal. Coordination: Normal gait   Psychiatric - Orientation to person, place, and time: Normal. Mood and affect: Normal.    Assessment/Plan   Problem List Items Addressed This Visit       Obesity - Primary     Other Visit Diagnoses       Screening for endocrine, nutritional, metabolic and immunity disorder   " "     Relevant Orders    Comprehensive Metabolic Panel    Ferritin    Hemoglobin A1C    Insulin, Fasting    Lipid Panel    Vitamin B12    Vitamin D 25-Hydroxy,Total (for eval of Vitamin D levels)    Referral to Nutrition Services    Prediabetes        Relevant Orders    Comprehensive Metabolic Panel    Ferritin    Hemoglobin A1C    Insulin, Fasting    Lipid Panel    Vitamin B12    Vitamin D 25-Hydroxy,Total (for eval of Vitamin D levels)    Referral to Nutrition Services    Dietary counseling        Exercise counseling        Polyphagia        Relevant Orders    Comprehensive Metabolic Panel    Ferritin    Hemoglobin A1C    Insulin, Fasting    Lipid Panel    Vitamin B12    Vitamin D 25-Hydroxy,Total (for eval of Vitamin D levels)    Referral to Nutrition Services          TREATMENT PLAN:  64 YO Male with Obesity here for Weight Management.  Current BMI: 32  [place] reviewed.   Informed patient the state of [place] requires 1-2 months of a \"good laury effort\" prior to the initiation of WLM.  Provided dietary and exercise recommendations; patient will implement these changes over the next 1-2 months and follow-up for a weight and BP check.   If some weight has been lost and BP is well controlled, will consider adding in antiobesity medications.   Counseled Heavily on Diet and Exercise; meal plan provided.  Will also check nutritional/metabolic labs to evaluate/screen for any abnormalities.   Risks and benefits discussed.   > 50% of time spent counseling on the importance of following recommended dietary modifications including: role of diet, low calorie and carbohydrate restrictions, limiting fast food and avoiding high sugary beverages.   Also discussed, the role of exercise with an ultimate goal of at least 250-300 minutes a week for weight loss and weight maintenance.   Patient verbalizes understanding.   Follow-up in 1-2 months.     "

## 2023-12-04 NOTE — PATIENT INSTRUCTIONS
"The Following were Discussed at your Visit Today:    # Weight Loss Medications  The state of [place] requires 1-2 months of a \"Good Kacie Effort\" prior to the initiation of anti-obesity medications.  Please implement the below diet and exercise recommendations over the next 1-2 months.   Return for a Weight and Blood Pressure Check in 1-2 months   If Blood Pressure is controlled and some weight has been lost, we can further discuss adding weight loss medications.   Please call your insurance company and ask about coverage for the following medications:   A. Qsymia  B. Contrave  C. Saxenda/Liraglutide  D. Wegovy/Semaglutide    ** You must call your insurance company BEFORE our next visit to find out if you have coverage for any anti-obesity medications. We cannot start any medications next visit without this information **    # Diet Recommendations:   Keep a food journal and log everything you eat and drink. You can use a phone applications like [employer], [employer] Complete it, a notebook, or the provided meal calendar.   Eat between 1659-9819 calories per day; meal plan provided to you this visit.  Consume less than 100 carbohydrates per day. Examples of carbohydrates include: bread, pasta, cakes, cookies, rice, cereal, fruit drinks, juice, soda and fruit.   Consume no more than 1 fruit per day.  Eat 3 meals and 1 snack. Each meal should have 3-4 oz of protein and vegetables. Limit starches.  Eat on a schedule and do not skip meals.  Meal Plan & Grocery List provided to you today.     # Exercise Recommendations:   Aim for 30 minutes per day, 5 days per week to start, with progression over several weeks to more vigorous intensity.   Emphasize increasing duration, rather than intensity initially.   Moderate-intensity physical activity includes:  * Brisk Walking  * Biking (slower than 10 MPH)  * Water Aerobics  * Vigorous housework (washing windows, vacuuming, mopping)  * Mowing the lawn (push mower)  * Gardening  * Ballroom " dancing    # Lab Work:   Labs orders are in the system.  Please go to any  facility to have these labs drawn.  Please be fasting for a minimum of 8 hours before you have labs drawn.  These labs need to be drawn before our next visit to determine what medication options we have.     # Registered Dietitian:  A referral to a Registered Dietitian has been placed.   You may call [phone] to schedule your appointment  Please be sure to see the Dietitian before our next visit.   Follow-up in 1-2 months

## 2023-12-05 ENCOUNTER — LAB (OUTPATIENT)
Dept: LAB | Facility: LAB | Age: 63
End: 2023-12-05
Payer: COMMERCIAL

## 2023-12-05 DIAGNOSIS — R73.03 PREDIABETES: ICD-10-CM

## 2023-12-05 DIAGNOSIS — Z13.29 SCREENING FOR ENDOCRINE, NUTRITIONAL, METABOLIC AND IMMUNITY DISORDER: ICD-10-CM

## 2023-12-05 DIAGNOSIS — Z13.228 SCREENING FOR ENDOCRINE, NUTRITIONAL, METABOLIC AND IMMUNITY DISORDER: ICD-10-CM

## 2023-12-05 DIAGNOSIS — Z13.21 SCREENING FOR ENDOCRINE, NUTRITIONAL, METABOLIC AND IMMUNITY DISORDER: ICD-10-CM

## 2023-12-05 DIAGNOSIS — R63.2 POLYPHAGIA: ICD-10-CM

## 2023-12-05 DIAGNOSIS — Z13.0 SCREENING FOR ENDOCRINE, NUTRITIONAL, METABOLIC AND IMMUNITY DISORDER: ICD-10-CM

## 2023-12-05 LAB
25(OH)D3 SERPL-MCNC: 19 NG/ML (ref 30–100)
ALBUMIN SERPL BCP-MCNC: 4.5 G/DL (ref 3.4–5)
ALP SERPL-CCNC: 70 U/L (ref 33–136)
ALT SERPL W P-5'-P-CCNC: 55 U/L (ref 10–52)
ANION GAP SERPL CALC-SCNC: 18 MMOL/L (ref 10–20)
AST SERPL W P-5'-P-CCNC: 38 U/L (ref 9–39)
BILIRUB SERPL-MCNC: 0.7 MG/DL (ref 0–1.2)
BUN SERPL-MCNC: 17 MG/DL (ref 6–23)
CALCIUM SERPL-MCNC: 9.5 MG/DL (ref 8.6–10.3)
CHLORIDE SERPL-SCNC: 99 MMOL/L (ref 98–107)
CHOLEST SERPL-MCNC: 261 MG/DL (ref 0–199)
CHOLESTEROL/HDL RATIO: 6.8
CO2 SERPL-SCNC: 26 MMOL/L (ref 21–32)
CREAT SERPL-MCNC: 0.75 MG/DL (ref 0.5–1.3)
EST. AVERAGE GLUCOSE BLD GHB EST-MCNC: 143 MG/DL
FERRITIN SERPL-MCNC: 594 NG/ML (ref 20–300)
GFR SERPL CREATININE-BSD FRML MDRD: >90 ML/MIN/1.73M*2
GLUCOSE SERPL-MCNC: 140 MG/DL (ref 74–99)
HBA1C MFR BLD: 6.6 %
HDLC SERPL-MCNC: 38.5 MG/DL
INSULIN P FAST SERPL-ACNC: 18 UIU/ML (ref 3–25)
LDLC SERPL CALC-MCNC: ABNORMAL MG/DL
NON HDL CHOLESTEROL: 223 MG/DL (ref 0–149)
POTASSIUM SERPL-SCNC: 4.7 MMOL/L (ref 3.5–5.3)
PROT SERPL-MCNC: 7.2 G/DL (ref 6.4–8.2)
SODIUM SERPL-SCNC: 138 MMOL/L (ref 136–145)
TRIGL SERPL-MCNC: 465 MG/DL (ref 0–149)
VIT B12 SERPL-MCNC: 399 PG/ML (ref 211–911)
VLDL: ABNORMAL

## 2023-12-05 PROCEDURE — 83525 ASSAY OF INSULIN: CPT

## 2023-12-05 PROCEDURE — 82306 VITAMIN D 25 HYDROXY: CPT

## 2023-12-05 PROCEDURE — 82607 VITAMIN B-12: CPT

## 2023-12-05 PROCEDURE — 80053 COMPREHEN METABOLIC PANEL: CPT

## 2023-12-05 PROCEDURE — 82728 ASSAY OF FERRITIN: CPT

## 2023-12-05 PROCEDURE — 80061 LIPID PANEL: CPT

## 2023-12-05 PROCEDURE — 83036 HEMOGLOBIN GLYCOSYLATED A1C: CPT

## 2023-12-05 PROCEDURE — 36415 COLL VENOUS BLD VENIPUNCTURE: CPT

## 2023-12-27 ENCOUNTER — APPOINTMENT (OUTPATIENT)
Dept: PAIN MEDICINE | Facility: CLINIC | Age: 63
End: 2023-12-27
Payer: COMMERCIAL

## 2024-01-08 PROBLEM — Z71.3 DIETARY COUNSELING: Status: ACTIVE | Noted: 2024-01-08

## 2024-01-08 PROBLEM — Z71.82 EXERCISE COUNSELING: Status: ACTIVE | Noted: 2024-01-08

## 2024-01-08 PROBLEM — E11.9 TYPE 2 DIABETES MELLITUS WITHOUT COMPLICATION, WITHOUT LONG-TERM CURRENT USE OF INSULIN (MULTI): Status: ACTIVE | Noted: 2024-01-08

## 2024-01-08 NOTE — PROGRESS NOTES
"Subjective   Patient ID: John Delatorre \"Denilson\" is a 63 y.o. male who presents for Weight Management.    HPI  64 YO Male with Obesity presents for a Weight Management Follow-up.  John was seen last month and given diet and exercise recommendations.  He was asked to implement these changes over the next 1 to 2 months and return, having lost some weight, to further discuss antiobesity medications and lab results.    Weight Hx:  Initial Weight: 249.6  Current Weight: 241  Total Loss: 8.6lbs    Diet Recall:  Breakfast: Skipped  Lunch: Veggie Creww Mein  Dinner: [place] [employer] Chowder  Snack(s): None    Current exercise routine:  None    Review of Systems Unless noted in the HPI all other systems have been reviewed and are negative for complaint    Objective   Physical Exam  General- No acute distress, well appearing and well nourished. Obese  Eyes Conjunctiva and lids: No erythema, swelling or discharge  Neck - Supple, no cervical lymphadenopathy.   Pulmonary - Respiratory effort: Normal respiration.   Cardiovascular - Normal rate and rhythm.  Examination of extremities for edema and/or varicosities: No peripheral edema  Abdomen: Soft, Non-tender, non-distended, no abdominal masses.   Musculoskeletal - Range of motion: normal  Skin - Skin and subcutaneous tissue: Normal without rashes or lesions.  Neurologic - Reflexes: Normal. Coordination: Normal gait   Psychiatric - Orientation to person, place, and time: Normal. Mood and affect: Normal.    Hemoglobin A1C   Date/Time Value Ref Range Status   2023 07:58 AM 6.6 (H) see below % Final     Ferritin   Date/Time Value Ref Range Status   2023 07:58  (H) 20 - 300 ng/mL Final     Insulin, Fasting   Date/Time Value Ref Range Status   2023 07:58 AM 18 3 - 25 uIU/mL Final     LIPIDS:  Total cholesterol: 261  Cholesterol:HDL ratio: 6.8  T    CMP:  Fasting Glucose: 140  ALT: 55    Assessment/Plan   Problem List Items Addressed This Visit       " Impaired fasting glucose    Obesity    Type 2 diabetes mellitus without complication, without long-term current use of insulin (CMS/[facility]) - Primary    Relevant Medications    semaglutide (Ozempic) 0.25 mg or 0.5 mg (2 mg/3 mL) pen injector    Dietary counseling    Exercise counseling     Other Visit Diagnoses       Elevated ferritin        Lipids abnormal        Hypertriglyceridemia        Drug-induced nausea and vomiting        Relevant Medications    ondansetron ODT (Zofran-ODT) 4 mg disintegrating tablet          TREATMENT PLAN:  62 YO Male with Obesity here for Weight Management.  Current BMI: 32  Total Loss: 8.6lbs  Labs personally reviewed with patient.   Discussed medications to assist with insulin resistance and appetite suppression.   After discussing all medication options, we decided to start Ozempic.  Patient denies personal or family hx of MTC, MENII or Pancreatitis.   Risks and benefits discussed.   > 50% of time spent counseling on the importance of following recommended dietary modifications including: role of diet, low calorie and carbohydrate restrictions, limiting fast food and avoiding high sugary beverages.   Also discussed, the role of exercise with an ultimate goal of at least 250-300 minutes a week for weight loss and weight maintenance.  Follow-up in 4 weeks.

## 2024-01-08 NOTE — PATIENT INSTRUCTIONS
"Diabetes  Your Hemoglobin A1C is 6.6%   This is in the \"diabetes\" category  Aggressive lifestyle modification focusing on weight reduction and increased physical activity is the primary therapy for the management of Diabetes.   Weight reduction is optimally achieved with a multimodality approach including diet, exercise and possible pharmacologic therapy.    Abnormal Lipid Panel:  Your Total Cholesterol Level is elevated.  Your Total Cholesterol to HDL ratio is elevated.  Your Triglycerides are elevated  This is not uncommon with insulin resistance/metabolic syndrome  Diet, exercise and weight reduction will help lower your Lipids but discussion with your PCP is always recommended.    Abnormal Ferritin:  Your ferritin level is elevated.  This can be due to metabolic syndrome and/or insulin resistance however we will monitor and if it continues to remain elevated I will refer to hematology for further evaluation.    Abnormal liver function test:  Your ALT is elevated.  This can be due to obesity and fatty liver disease.  We will continue to monitor as you lose weight and if this number remains elevated I will refer you to GI for further evaluation.    #Ozempic:  You have been prescribed Ozempic, off-label, for the treatment of Insulin Resistance and Metabolic Syndrome.  Ozempic is a Glucagon-like peptide-1 (GLP-1) receptor agonist indicated as an adjunct to a reduced calorie diet and increased physical activity for chronic weight management in adults.   This medication works by decreasing the appetite and increased feeling of fullness.   Administer Ozempic once weekly, on the same day each week, at any time of day, with or without meals.   Inject subcutaneously in the abdomen, thigh or upper arm.  The initial dose for Ozempic is 0.25 mg weekly x 4 weeks, then  Increase to 0.5mg weekly x 4 weeks then we will touch base.    Possible Side Effects Include: Nausea, Diarrhea, Vomiting, Constipation, Abdominal Pain, " Headache, Fatigue, Dyspepsia, Dizziness, Abdominal Distention, Hypoglycemia (in patients with Type II Diabetes), flatulence, gastroenteritis, and GERD.    *For Constipation--> take a fiber supplement or stool softener daily. Make sure you are drinking at least 64oz of water daily.  *For Nausea--> Eat small, frequent meals throughout the day. If nausea persists, please call the office.    Follow-up in 8 weeks

## 2024-01-09 ENCOUNTER — APPOINTMENT (OUTPATIENT)
Dept: RADIOLOGY | Facility: CLINIC | Age: 64
End: 2024-01-09
Payer: COMMERCIAL

## 2024-01-09 ENCOUNTER — OFFICE VISIT (OUTPATIENT)
Dept: PAIN MEDICINE | Facility: CLINIC | Age: 64
End: 2024-01-09
Payer: COMMERCIAL

## 2024-01-09 VITALS
WEIGHT: 241 LBS | HEART RATE: 74 BPM | HEIGHT: 73 IN | DIASTOLIC BLOOD PRESSURE: 79 MMHG | SYSTOLIC BLOOD PRESSURE: 108 MMHG | BODY MASS INDEX: 31.94 KG/M2

## 2024-01-09 DIAGNOSIS — E78.89 LIPIDS ABNORMAL: ICD-10-CM

## 2024-01-09 DIAGNOSIS — Z71.3 DIETARY COUNSELING: ICD-10-CM

## 2024-01-09 DIAGNOSIS — R79.89 ELEVATED FERRITIN: ICD-10-CM

## 2024-01-09 DIAGNOSIS — E78.1 HYPERTRIGLYCERIDEMIA: ICD-10-CM

## 2024-01-09 DIAGNOSIS — Z71.82 EXERCISE COUNSELING: ICD-10-CM

## 2024-01-09 DIAGNOSIS — T50.905A DRUG-INDUCED NAUSEA AND VOMITING: ICD-10-CM

## 2024-01-09 DIAGNOSIS — E66.09 CLASS 1 OBESITY DUE TO EXCESS CALORIES WITHOUT SERIOUS COMORBIDITY WITH BODY MASS INDEX (BMI) OF 31.0 TO 31.9 IN ADULT: ICD-10-CM

## 2024-01-09 DIAGNOSIS — E11.9 TYPE 2 DIABETES MELLITUS WITHOUT COMPLICATION, WITHOUT LONG-TERM CURRENT USE OF INSULIN (MULTI): Primary | ICD-10-CM

## 2024-01-09 DIAGNOSIS — R73.01 IMPAIRED FASTING GLUCOSE: ICD-10-CM

## 2024-01-09 DIAGNOSIS — R11.2 DRUG-INDUCED NAUSEA AND VOMITING: ICD-10-CM

## 2024-01-09 PROCEDURE — 3074F SYST BP LT 130 MM HG: CPT | Performed by: NURSE PRACTITIONER

## 2024-01-09 PROCEDURE — 99214 OFFICE O/P EST MOD 30 MIN: CPT | Performed by: NURSE PRACTITIONER

## 2024-01-09 PROCEDURE — 3008F BODY MASS INDEX DOCD: CPT | Performed by: NURSE PRACTITIONER

## 2024-01-09 PROCEDURE — 3078F DIAST BP <80 MM HG: CPT | Performed by: NURSE PRACTITIONER

## 2024-01-09 PROCEDURE — 99401 PREV MED CNSL INDIV APPRX 15: CPT | Performed by: NURSE PRACTITIONER

## 2024-01-09 PROCEDURE — 1036F TOBACCO NON-USER: CPT | Performed by: NURSE PRACTITIONER

## 2024-01-09 RX ORDER — ONDANSETRON 4 MG/1
4 TABLET, ORALLY DISINTEGRATING ORAL EVERY 8 HOURS PRN
Qty: 42 TABLET | Refills: 2 | Status: SHIPPED | OUTPATIENT
Start: 2024-01-09 | End: 2024-01-23

## 2024-01-09 RX ORDER — SEMAGLUTIDE 0.68 MG/ML
INJECTION, SOLUTION SUBCUTANEOUS
Qty: 3 ML | Refills: 1 | Status: SHIPPED | OUTPATIENT
Start: 2024-01-09 | End: 2024-04-01 | Stop reason: SDUPTHER

## 2024-01-09 RX ORDER — SEMAGLUTIDE 0.68 MG/ML
0.5 INJECTION, SOLUTION SUBCUTANEOUS
Qty: 3 ML | Refills: 1 | Status: SHIPPED | OUTPATIENT
Start: 2024-01-09 | End: 2024-01-09

## 2024-01-09 ASSESSMENT — PAIN - FUNCTIONAL ASSESSMENT: PAIN_FUNCTIONAL_ASSESSMENT: NO/DENIES PAIN

## 2024-01-11 ENCOUNTER — TELEPHONE (OUTPATIENT)
Dept: PAIN MEDICINE | Facility: CLINIC | Age: 64
End: 2024-01-11
Payer: COMMERCIAL

## 2024-01-17 ENCOUNTER — APPOINTMENT (OUTPATIENT)
Dept: PAIN MEDICINE | Facility: CLINIC | Age: 64
End: 2024-01-17
Payer: COMMERCIAL

## 2024-01-18 ENCOUNTER — APPOINTMENT (OUTPATIENT)
Dept: PAIN MEDICINE | Facility: CLINIC | Age: 64
End: 2024-01-18
Payer: COMMERCIAL

## 2024-01-19 ASSESSMENT — PROMIS GLOBAL HEALTH SCALE
RATE_PHYSICAL_HEALTH: FAIR
EMOTIONAL_PROBLEMS: SOMETIMES
RATE_AVERAGE_PAIN: 4
RATE_GENERAL_HEALTH: FAIR
CARRYOUT_SOCIAL_ACTIVITIES: FAIR
RATE_QUALITY_OF_LIFE: GOOD
RATE_SOCIAL_SATISFACTION: FAIR
RATE_MENTAL_HEALTH: FAIR
RATE_AVERAGE_FATIGUE: MODERATE
CARRYOUT_PHYSICAL_ACTIVITIES: MODERATELY

## 2024-01-25 PROBLEM — Z71.82 EXERCISE COUNSELING: Status: RESOLVED | Noted: 2024-01-08 | Resolved: 2024-01-25

## 2024-01-25 PROBLEM — Z71.3 DIETARY COUNSELING: Status: RESOLVED | Noted: 2024-01-08 | Resolved: 2024-01-25

## 2024-01-25 PROBLEM — R73.01 IMPAIRED FASTING GLUCOSE: Status: RESOLVED | Noted: 2023-09-07 | Resolved: 2024-01-25

## 2024-01-26 ENCOUNTER — OFFICE VISIT (OUTPATIENT)
Dept: PRIMARY CARE | Facility: CLINIC | Age: 64
End: 2024-01-26
Payer: COMMERCIAL

## 2024-01-26 VITALS
SYSTOLIC BLOOD PRESSURE: 120 MMHG | WEIGHT: 237.2 LBS | TEMPERATURE: 97 F | BODY MASS INDEX: 31.29 KG/M2 | HEART RATE: 84 BPM | DIASTOLIC BLOOD PRESSURE: 76 MMHG | OXYGEN SATURATION: 97 %

## 2024-01-26 DIAGNOSIS — E78.2 COMBINED HYPERLIPIDEMIA: ICD-10-CM

## 2024-01-26 DIAGNOSIS — Z00.00 ROUTINE GENERAL MEDICAL EXAMINATION AT A HEALTH CARE FACILITY: Primary | ICD-10-CM

## 2024-01-26 DIAGNOSIS — I10 BENIGN HYPERTENSION: ICD-10-CM

## 2024-01-26 DIAGNOSIS — E11.9 TYPE 2 DIABETES MELLITUS WITHOUT COMPLICATION, WITHOUT LONG-TERM CURRENT USE OF INSULIN (MULTI): ICD-10-CM

## 2024-01-26 DIAGNOSIS — F43.0 ACUTE STRESS REACTION: ICD-10-CM

## 2024-01-26 DIAGNOSIS — N40.1 BENIGN PROSTATIC HYPERPLASIA WITH LOWER URINARY TRACT SYMPTOMS, SYMPTOM DETAILS UNSPECIFIED: ICD-10-CM

## 2024-01-26 DIAGNOSIS — Z23 ENCOUNTER FOR IMMUNIZATION: ICD-10-CM

## 2024-01-26 PROCEDURE — 90686 IIV4 VACC NO PRSV 0.5 ML IM: CPT | Performed by: FAMILY MEDICINE

## 2024-01-26 PROCEDURE — 99396 PREV VISIT EST AGE 40-64: CPT | Performed by: FAMILY MEDICINE

## 2024-01-26 PROCEDURE — 3008F BODY MASS INDEX DOCD: CPT | Performed by: FAMILY MEDICINE

## 2024-01-26 PROCEDURE — 3074F SYST BP LT 130 MM HG: CPT | Performed by: FAMILY MEDICINE

## 2024-01-26 PROCEDURE — 1036F TOBACCO NON-USER: CPT | Performed by: FAMILY MEDICINE

## 2024-01-26 PROCEDURE — 90750 HZV VACC RECOMBINANT IM: CPT | Performed by: FAMILY MEDICINE

## 2024-01-26 PROCEDURE — 3078F DIAST BP <80 MM HG: CPT | Performed by: FAMILY MEDICINE

## 2024-01-26 RX ORDER — HYDROXYZINE HYDROCHLORIDE 10 MG/1
10 TABLET, FILM COATED ORAL DAILY PRN
Qty: 30 TABLET | Refills: 1 | Status: SHIPPED | OUTPATIENT
Start: 2024-01-26 | End: 2024-05-03

## 2024-01-26 ASSESSMENT — PATIENT HEALTH QUESTIONNAIRE - PHQ9
SUM OF ALL RESPONSES TO PHQ9 QUESTIONS 1 & 2: 0
1. LITTLE INTEREST OR PLEASURE IN DOING THINGS: NOT AT ALL
2. FEELING DOWN, DEPRESSED OR HOPELESS: NOT AT ALL

## 2024-01-26 ASSESSMENT — ENCOUNTER SYMPTOMS
LOSS OF SENSATION IN FEET: 0
OCCASIONAL FEELINGS OF UNSTEADINESS: 0
DEPRESSION: 0

## 2024-01-26 ASSESSMENT — PAIN SCALES - GENERAL: PAINLEVEL: 3

## 2024-01-26 ASSESSMENT — LIFESTYLE VARIABLES
HOW OFTEN DO YOU HAVE SIX OR MORE DRINKS ON ONE OCCASION: NEVER
HOW MANY STANDARD DRINKS CONTAINING ALCOHOL DO YOU HAVE ON A TYPICAL DAY: PATIENT DOES NOT DRINK
HOW OFTEN DO YOU HAVE A DRINK CONTAINING ALCOHOL: NEVER
SKIP TO QUESTIONS 9-10: 1
AUDIT-C TOTAL SCORE: 0

## 2024-01-26 NOTE — PROGRESS NOTES
Subjective   Patient ID: Denilson Delatorre is a 63 y.o. male who presents for Annual Exam.    Here for annual physical today.     Hypertension  -Patient is here for follow-up of elevated blood pressure.   -Blood pressure is well controlled.  -Cardiac symptoms: none.   -Patient denies chest pain, dyspnea, and irregular heart beat.   -Cardiologist:    Osteoarthritis/Pain Management  -History of: Post Laminectomy Syndrome  -F/U: Seeing pain management - had epidural injection.  Had MRI.  Pt has follow up with pain management  -Treatment: Epidural injection  -Past Evaluation: Mri, xrays  -Specialist: Cindy Castaneda  -Past Medications:    For DM2:  -A1c: 6.6  -Follow up:  Seeing Linda Mota - started on ozempic.  Tried intermittent fasting.  Pt is losing weight.  Appetite is decreased.  Pt has issues with overeating  -Hypoglycemic Episodes:   none  -Glucose monitoring: none    Anxiety  -F/U:  Pt has been having more panic attacks.  Pt would like to try something short acting, as needed.   -Symptoms have been gradually worsening    -She denies current suicidal and homicidal ideation.    -Current Treatment: none  -Counseling: none  -Previous treatment includes: none                  Review of Systems    Objective   /76 (BP Location: Right arm, Patient Position: Sitting)   Pulse 84   Temp 36.1 °C (97 °F) (Temporal)   Wt 108 kg (237 lb 3.2 oz)   SpO2 97%   BMI 31.29 kg/m²     Physical Exam  Vitals reviewed.   Constitutional:       General: He is not in acute distress.  Cardiovascular:      Rate and Rhythm: Normal rate and regular rhythm.   Pulmonary:      Effort: Pulmonary effort is normal.      Breath sounds: No wheezing or rhonchi.   Musculoskeletal:      Right lower leg: No edema.      Left lower leg: No edema.   Lymphadenopathy:      Cervical: No cervical adenopathy.   Neurological:      Mental Status: He is alert.         Assessment/Plan   Diagnoses and all orders for this visit:  Routine general  medical examination at a health care facility  Type 2 diabetes mellitus without complication, without long-term current use of insulin (CMS/[facility])  Benign hypertension  Combined hyperlipidemia  Benign prostatic hyperplasia with lower urinary tract symptoms, symptom details unspecified    Patient Instructions   Here for physical.  Order for PSA in computer.  Get done with Linda Mota blood work.  Up to date on colonoscopy.  Flu and shingles today.  Booster at next visit for shingles.     New onset diabetic.  Continue ozempic - increase to 0.5mg when ready.  Check feet next visit.  Routine annual eye visits.     For anxiety - trial of hydroxyzine - start 10mg.  If not helping increase to 2 tabs.      Follow up in 3 months.

## 2024-01-26 NOTE — PATIENT INSTRUCTIONS
Here for physical.  Order for PSA in computer.  Get done with Linda Mota blood work.  Up to date on colonoscopy.  Flu and shingles today.  Booster at next visit for shingles.     New onset diabetic.  Continue ozempic - increase to 0.5mg when ready.  Check feet next visit.  Routine annual eye visits.     For anxiety - trial of hydroxyzine - start 10mg.  If not helping increase to 2 tabs.      Follow up in 3 months.

## 2024-01-31 ENCOUNTER — OFFICE VISIT (OUTPATIENT)
Dept: PAIN MEDICINE | Facility: CLINIC | Age: 64
End: 2024-01-31
Payer: COMMERCIAL

## 2024-01-31 ENCOUNTER — TELEPHONE (OUTPATIENT)
Dept: PAIN MEDICINE | Facility: CLINIC | Age: 64
End: 2024-01-31
Payer: COMMERCIAL

## 2024-01-31 VITALS
DIASTOLIC BLOOD PRESSURE: 74 MMHG | BODY MASS INDEX: 31.41 KG/M2 | RESPIRATION RATE: 20 BRPM | HEIGHT: 73 IN | SYSTOLIC BLOOD PRESSURE: 108 MMHG | WEIGHT: 237 LBS | HEART RATE: 84 BPM

## 2024-01-31 DIAGNOSIS — M54.2 CERVICALGIA: ICD-10-CM

## 2024-01-31 DIAGNOSIS — M54.12 CERVICAL RADICULITIS: ICD-10-CM

## 2024-01-31 DIAGNOSIS — M54.16 LUMBAR RADICULITIS: Primary | ICD-10-CM

## 2024-01-31 DIAGNOSIS — E66.09 CLASS 1 OBESITY DUE TO EXCESS CALORIES WITHOUT SERIOUS COMORBIDITY WITH BODY MASS INDEX (BMI) OF 31.0 TO 31.9 IN ADULT: ICD-10-CM

## 2024-01-31 DIAGNOSIS — G57.11 MERALGIA PARESTHETICA OF RIGHT SIDE: ICD-10-CM

## 2024-01-31 DIAGNOSIS — M96.1 LUMBAR POSTLAMINECTOMY SYNDROME: ICD-10-CM

## 2024-01-31 PROCEDURE — 3008F BODY MASS INDEX DOCD: CPT | Performed by: ANESTHESIOLOGY

## 2024-01-31 PROCEDURE — 1036F TOBACCO NON-USER: CPT | Performed by: ANESTHESIOLOGY

## 2024-01-31 PROCEDURE — 99401 PREV MED CNSL INDIV APPRX 15: CPT | Performed by: ANESTHESIOLOGY

## 2024-01-31 PROCEDURE — 99214 OFFICE O/P EST MOD 30 MIN: CPT | Performed by: ANESTHESIOLOGY

## 2024-01-31 PROCEDURE — 3074F SYST BP LT 130 MM HG: CPT | Performed by: ANESTHESIOLOGY

## 2024-01-31 PROCEDURE — 3078F DIAST BP <80 MM HG: CPT | Performed by: ANESTHESIOLOGY

## 2024-01-31 RX ORDER — METHYLPREDNISOLONE 4 MG/1
TABLET ORAL
Qty: 21 TABLET | Refills: 0 | Status: SHIPPED | OUTPATIENT
Start: 2024-01-31 | End: 2024-02-07

## 2024-01-31 ASSESSMENT — PAIN - FUNCTIONAL ASSESSMENT: PAIN_FUNCTIONAL_ASSESSMENT: 0-10

## 2024-01-31 ASSESSMENT — PAIN SCALES - GENERAL
PAINLEVEL_OUTOF10: 3
PAINLEVEL: 3

## 2024-01-31 ASSESSMENT — PATIENT HEALTH QUESTIONNAIRE - PHQ9
SUM OF ALL RESPONSES TO PHQ9 QUESTIONS 1 AND 2: 0
1. LITTLE INTEREST OR PLEASURE IN DOING THINGS: NOT AT ALL
2. FEELING DOWN, DEPRESSED OR HOPELESS: NOT AT ALL

## 2024-01-31 ASSESSMENT — PAIN DESCRIPTION - DESCRIPTORS: DESCRIPTORS: ACHING

## 2024-01-31 NOTE — H&P (VIEW-ONLY)
PAIN MANAGEMENT FOLLOW-UP OFFICE NOTE    Date of Service: 1/31/2024    SUBJECTIVE    CHIEF COMPLAINT: LBP    HISTORY OF PRESENT ILLNESS    John Delatorre is a 63 y.o. male with PMH  obesity, HTN, CHON non-adherent to CPAP, pDM2 who presents for follow-up LB pain.    On 11/30, pt underwent R LFCNB with 100% relief (and numbness) for 3 days before R meralgia paresthetica returned to baseline.    Pt endorses ongoing LBP. Pain continues to radiate to L groin and leg. Completed MRI and would like to discuss results and next steps.     Neck pain since 2019 without inciting incident. Pain radiates to BL shoulders and is associated with BL thumb numbness. Notes left hand weakness. Pain has been refractive to rest.    Pt denies new-onset numbness, weakness, bowel/bladder incontinence.  Pt denies recent infection, allergy to Latex/iodine/contrast. Patient is currently taking the following blood thinner(s): N/A    Procedure Log:  -R LFCNB 11/30/23: 100% relief (and numbness) for 3 days    REVIEW OF SYSTEMS  Review of Systems   Constitutional: Negative.    HENT: Negative.     Eyes: Negative.    Respiratory: Negative.     Cardiovascular: Negative.    Gastrointestinal: Negative.    Endocrine: Negative.    Musculoskeletal:  Positive for back pain, myalgias and neck stiffness.   Skin: Negative.    Neurological:  Positive for weakness and numbness.   Hematological: Negative.    Psychiatric/Behavioral: Negative.         PAST MEDICAL HISTORY  Past Medical History:   Diagnosis Date    Personal history of other endocrine, nutritional and metabolic disease 03/01/2019    History of diabetes mellitus    Personal history of other endocrine, nutritional and metabolic disease 03/01/2019    History of hyperlipidemia     Past Surgical History:   Procedure Laterality Date    ABDOMINAL SURGERY      HERNIA REPAIR      OTHER SURGICAL HISTORY  03/01/2019    Back surgery    TONSILLECTOMY      US GUIDED PERCUTANEOUS PLACEMENT  11/30/2023    US GUIDED  "PERCUTANEOUS PLACEMENT 11/30/2023 [facility]     Family History   Problem Relation Name Age of Onset    Hyperlipidemia Mother      Hypertension Mother      Stroke Mother          CVA's    Skin cancer Mother      Diabetes Father      Heart disease Father      Leukemia Father          Acute myeloid leukemia    Hypertension Sister      Hyperlipidemia Sister      Diabetes Sister      Obesity Sister         CURRENT MEDICATIONS  Current Outpatient Medications   Medication Sig Dispense Refill    hydrOXYzine HCL (Atarax) 10 mg tablet Take 1 tablet (10 mg) by mouth once daily as needed for anxiety. 30 tablet 1    lidocaine (Xylocaine) 5 % ointment Apply topically every 12 hours.      Lidoderm 5 % patch Place 1 patch on the skin once daily.      semaglutide (Ozempic) 0.25 mg or 0.5 mg (2 mg/3 mL) pen injector Inject 0.25 mg under the skin every 7 days for 28 days, THEN 0.5 mg every 7 days for 28 days. 3 mL 1     No current facility-administered medications for this visit.       ALLERGIES AND DRUG REACTIONS  Allergies   Allergen Reactions    Atorvastatin Other     Abdominal pain     Gemfibrozil Unknown    Niacin Other     Rectal bleeding     Rosuvastatin Other     Abdominal pain           OBJECTIVE  Visit Vitals  /74   Pulse 84   Resp 20   Ht 1.854 m (6' 1\")   Wt 108 kg (237 lb)   BMI 31.27 kg/m²   Smoking Status Never   BSA 2.36 m²       Last Recorded Pain Score (if available):                Physical Exam  General: Sitting in chair, NAD  Head: NCAT  Eyes: Sclera/conjunctiva clear, EOMI, PERRL  Nose/mouth: MMM  CV: Good distal pulses  Lungs: Good/equal chest excursion  Abdomen: Soft, ND  Ext: No cyanosis/edema  MSK: L-spine alignment: WNL, BL paraspinal m NTTP, L-spine ROM: full   C-spine alignment: WNL, BL paraspinal m TTP, c-spine ROM: full with pain on extension. Neg Spurling, Lhermitte    Neuro: AAOx3   Dermatome sensation to light touch  LEFT C5: WNL    RIGHT C5: WNL      LEFT C6: WNL       RIGHT C6: WNL      LEFT C7: " WNL       RIGHT C7: WNL      LEFT C8: WNL       RIGHT C8: WNL      LEFT T1: WNL       RIGHT T1: WNL    LEFT L1 (lower pelvis/upper thigh): WNL    RIGHT L1: decreased in distribution of LFCN    LEFT L2 (upper thigh): WNL       RIGHT: L2:decreased in distribution of LFCN    LEFT L3 (medial knee): WNL       RIGHT L3: decreased in distribution of LFCN    LEFT L4 (superior medial malleolus): WNL       RIGHT L4: WNL      LEFT L5 (dorsal foot): WNL       RIGHT L5: WNL      LEFT S1 (lateral foot): WNL     RIGHT S1: WNL      LEFT S2 (popliteal fossa): WNL    RIGHT S2: WNL        Motor strength  LEFT C5 (elbow flexion): 5/5   RIGHT C5: 5/5  LEFT C6 (wrist extension): 5/5     RIGHT C6: 5/5  LEFT C7 (elbow extension): 5/5     RIGHT C7: 5/5  LEFT C8 (finger abduction): 5/5     RIGHT C8: 5/5  LEFT T1 (hand ): 5/5     RIGHT T1: 5/5    LEFT L2 (hip flexion): 5/5   RIGHT L2: 5/5  LEFT L3 (knee extension): 5/5     RIGHT L3: 5/5  LEFT L4 (dorsiflexion): 5/5     RIGHT L4: 5/5  LEFT L5 (EHL extension): 5/5     RIGHT L5: 5/5  LEFT S1 (plantarflexion): 5/5     RIGHT S1: 5/5  LEFT S2 (knee flexion): 5/5      RIGHT S2: 5/5    Special testing  Gill: neg BL  DTR unremarkable  Seated slump test neg BL  Clonus: neg BL  Babinski: neg BL    Psych: affect nl  Skin: no rash/lesions      REVIEW OF LABORATORY DATA  I have reviewed the following lab results:  WBC   Date Value Ref Range Status   02/20/2023 6.8 4.4 - 11.3 x10E9/L Final     RBC   Date Value Ref Range Status   02/20/2023 5.42 4.50 - 5.90 x10E12/L Final     Hemoglobin   Date Value Ref Range Status   02/20/2023 15.6 13.5 - 17.5 g/dL Final     Hematocrit   Date Value Ref Range Status   02/20/2023 48.8 41.0 - 52.0 % Final     MCV   Date Value Ref Range Status   02/20/2023 90 80 - 100 fL Final     MCH   Date Value Ref Range Status   01/16/2019 29.5 26 - 34 PG Final     MCHC   Date Value Ref Range Status   02/20/2023 32.0 32.0 - 36.0 g/dL Final     RDW   Date Value Ref Range Status  "  02/20/2023 13.3 11.5 - 14.5 % Final     Platelets   Date Value Ref Range Status   02/20/2023 320 150 - 450 x10E9/L Final     MPV   Date Value Ref Range Status   01/16/2019 9.5 7.0 - 12.6 CU Final     Sodium   Date Value Ref Range Status   12/05/2023 138 136 - 145 mmol/L Final     Potassium   Date Value Ref Range Status   12/05/2023 4.7 3.5 - 5.3 mmol/L Final     Bicarbonate   Date Value Ref Range Status   12/05/2023 26 21 - 32 mmol/L Final     Urea Nitrogen   Date Value Ref Range Status   12/05/2023 17 6 - 23 mg/dL Final     Calcium   Date Value Ref Range Status   12/05/2023 9.5 8.6 - 10.3 mg/dL Final     No results found for: \"PROTIME\", \"PTT\", \"INR\", \"FIBRINOGEN\"      REVIEW OF RADIOLOGY   I have reviewed the following:  Radiology Studies           MRI c-spine 11/11/19:  Alignment: There is straightening of the cervical spine.     Vertebrae/Intervertebral Discs: The vertebral bodies demonstrate  expected height. There is hypointense T1 and T2 signal in the dens  consistent with sclerosis due to chronic degenerative changes.  Otherwise, marrow signal is within normal limits. There is diffuse  desiccation of the intervertebral discs. Intervertebral disc heights  are maintained.     Cord: Spinal cord is normal in signal and caliber.     C1-C2: There is no central canal stenosis.     C2-C3: There is a disc osteophyte complex which partially effaces the  ventral thecal sac and causes overall mild spinal canal stenosis.  There is mild left uncovertebral hypertrophy and facet  osteoarthropathy. There is no significant narrowing of the neural  foramina. There is no right facet osteoarthropathy.     C3-C4: There is osteophyte disc complex, partially effacing the  ventral thecal sac and causing overall moderate spinal canal  stenosis. There are bilateral uncovertebral osteophytes, right  greater than left, and mild right facet osteoarthropathy. There is  resulting at most mild bilateral neural foraminal narrowing. There " is  no significant left-sided facet osteoarthropathy.     C4-C5: There is a disc osteophyte complex including a central disc  protrusion which causes marked spinal canal stenosis and indents the  ventral cord. There is mild bilateral neural foraminal narrowing due  to uncovertebral hypertrophy and facet osteoarthropathy.     C5-C6: There is moderate spinal canal stenosis due to disc osteophyte  complex. There is mild-to-moderate bilateral neural foraminal  narrowing due to uncovertebral hypertrophy and facet osteoarthropathy.     C6-C7: There is mild spinal canal stenosis due to disc osteophyte  complex. There is mild bilateral neural foraminal narrowing due to  uncovertebral hypertrophy. There is no significant facet  osteoarthropathy.     C7-T1: There is no posterior disc contour abnormality. There is no  spinal canal or neural foraminal stenosis. There is mild left facet  osteoarthropathy. There is no right facet osteoarthropathy.     There is a left paracentral disc protrusion at T2-T3 which partially  effaces the ventral thecal sac. There is no spinal canal stenosis.  The disc extends into the left neural foramen but causes no  significant neural foraminal narrowing. There is no narrowing of the  right neural foramen. There is mild left facet osteoarthropathy.     The prevertebral and posterior paraspinal soft tissues are  unremarkable.     IMPRESSION:  1. Multilevel degenerative changes in the cervical spine, most  pronounced at the level C4-C5, where there is marked spinal canal  stenosis. There is moderate spinal canal stenosis at C3-C4 and C5-C6  and mild spinal canal stenosis at C2-C3 and C6-C7. There is variable  degree of neural foraminal narrowing at multiple levels as detailed  above.           ASSESSMENT & PLAN  John Delatorre is a 63 y.o. old male with PMH obesity, HTN, CHON non-adherent to CPAP, pDM2 who presents for F/U LB pain       1) Lumbar PLS after L3-S1 lami 2000  -Relieved his BLE pain, but  worsening LBP radiating to L groin  -Refractive to yrs of conservative tx including Tylenol, NSAIDs, tizanidine, MDP, >6 w PT   -Reviewed/discussed MRI L-spine 1/18/24: stable L3-S1 lami, L2-3 disc bulge/LFH contributing to mild canal/BL NF stenosis, left mod-severe vs mild R NFS at L3-4 with disc osteophyte displacing nerve, mod BL NFS at L4-5, mod-sever eL>R NFS at L5-S1  -Last GIA 2019 by Dr Osorio supposedly gave excellent relief. Pt interested in repeating  -Schedule left L3-4, L4-5 TFESI to target pain generator as seen on imaging and minimize risk/likelihood of chronic opioid use and/or surgery  -Consider SCS if refractive     2) R meralgia paresthetica  -Objective numbness in distribution of R LFCN likely 2/2 obesity  -R LFNCB 11/30: 100% relief (and numbness) for 3 days  -Briefly discussed DRG, but pt would like to revisit at a later date    3) Neck pain  -Neck pain since 2019 radiating to BL shoulders associated with BL thumb numbness, left hand weakness without obj deficit most c/w radiculitis  -Refractive to >4 y conservative tx including rest  -MDP, PRN Tylenol, PRN ibuprofen, referral to PT    4) Obesity  Discussed pt's obesity: BMI 31.3. Discussed its potential affect on worsening chronic pain through mechanical stress as well as neuro-inflammatory chemicals. This is in addition to contribution to metabolic syndrome and overal health and perioperative risk. Seeing [facility] clinic and has lost 20 lb since initiation        Discussed procedure risks/benefits in detail with patient. Pt meets medical necessity for procedure due to failure of conservative measures. Reviewed procedural risks including bleeding, infection, nerve damage, paralysis. Also reviewed mitigating factors such as screening for infection/blood thinner use, sterile precautions, and image-guidance when applicable. All questions answered. Pt/guardian expressed understanding and choose to proceed            Gail Chow  MD  Anesthesiologist & Interventional Pain Physician   [facility]  O: [phone]  F: [phone]  1:47 PM  01/31/24

## 2024-01-31 NOTE — PROGRESS NOTES
PAIN MANAGEMENT FOLLOW-UP OFFICE NOTE    Date of Service: 1/31/2024    SUBJECTIVE    CHIEF COMPLAINT: LBP    HISTORY OF PRESENT ILLNESS    John Delatorre is a 63 y.o. male with PMH  obesity, HTN, CHON non-adherent to CPAP, pDM2 who presents for follow-up LB pain.    On 11/30, pt underwent R LFCNB with 100% relief (and numbness) for 3 days before R meralgia paresthetica returned to baseline.    Pt endorses ongoing LBP. Pain continues to radiate to L groin and leg. Completed MRI and would like to discuss results and next steps.     Neck pain since 2019 without inciting incident. Pain radiates to BL shoulders and is associated with BL thumb numbness. Notes left hand weakness. Pain has been refractive to rest.    Pt denies new-onset numbness, weakness, bowel/bladder incontinence.  Pt denies recent infection, allergy to Latex/iodine/contrast. Patient is currently taking the following blood thinner(s): N/A    Procedure Log:  -R LFCNB 11/30/23: 100% relief (and numbness) for 3 days    REVIEW OF SYSTEMS  Review of Systems   Constitutional: Negative.    HENT: Negative.     Eyes: Negative.    Respiratory: Negative.     Cardiovascular: Negative.    Gastrointestinal: Negative.    Endocrine: Negative.    Musculoskeletal:  Positive for back pain, myalgias and neck stiffness.   Skin: Negative.    Neurological:  Positive for weakness and numbness.   Hematological: Negative.    Psychiatric/Behavioral: Negative.         PAST MEDICAL HISTORY  Past Medical History:   Diagnosis Date    Personal history of other endocrine, nutritional and metabolic disease 03/01/2019    History of diabetes mellitus    Personal history of other endocrine, nutritional and metabolic disease 03/01/2019    History of hyperlipidemia     Past Surgical History:   Procedure Laterality Date    ABDOMINAL SURGERY      HERNIA REPAIR      OTHER SURGICAL HISTORY  03/01/2019    Back surgery    TONSILLECTOMY      US GUIDED PERCUTANEOUS PLACEMENT  11/30/2023    US GUIDED  "PERCUTANEOUS PLACEMENT 11/30/2023 [facility]     Family History   Problem Relation Name Age of Onset    Hyperlipidemia Mother      Hypertension Mother      Stroke Mother          CVA's    Skin cancer Mother      Diabetes Father      Heart disease Father      Leukemia Father          Acute myeloid leukemia    Hypertension Sister      Hyperlipidemia Sister      Diabetes Sister      Obesity Sister         CURRENT MEDICATIONS  Current Outpatient Medications   Medication Sig Dispense Refill    hydrOXYzine HCL (Atarax) 10 mg tablet Take 1 tablet (10 mg) by mouth once daily as needed for anxiety. 30 tablet 1    lidocaine (Xylocaine) 5 % ointment Apply topically every 12 hours.      Lidoderm 5 % patch Place 1 patch on the skin once daily.      semaglutide (Ozempic) 0.25 mg or 0.5 mg (2 mg/3 mL) pen injector Inject 0.25 mg under the skin every 7 days for 28 days, THEN 0.5 mg every 7 days for 28 days. 3 mL 1     No current facility-administered medications for this visit.       ALLERGIES AND DRUG REACTIONS  Allergies   Allergen Reactions    Atorvastatin Other     Abdominal pain     Gemfibrozil Unknown    Niacin Other     Rectal bleeding     Rosuvastatin Other     Abdominal pain           OBJECTIVE  Visit Vitals  /74   Pulse 84   Resp 20   Ht 1.854 m (6' 1\")   Wt 108 kg (237 lb)   BMI 31.27 kg/m²   Smoking Status Never   BSA 2.36 m²       Last Recorded Pain Score (if available):                Physical Exam  General: Sitting in chair, NAD  Head: NCAT  Eyes: Sclera/conjunctiva clear, EOMI, PERRL  Nose/mouth: MMM  CV: Good distal pulses  Lungs: Good/equal chest excursion  Abdomen: Soft, ND  Ext: No cyanosis/edema  MSK: L-spine alignment: WNL, BL paraspinal m NTTP, L-spine ROM: full   C-spine alignment: WNL, BL paraspinal m TTP, c-spine ROM: full with pain on extension. Neg Spurling, Lhermitte    Neuro: AAOx3   Dermatome sensation to light touch  LEFT C5: WNL    RIGHT C5: WNL      LEFT C6: WNL       RIGHT C6: WNL      LEFT C7: " WNL       RIGHT C7: WNL      LEFT C8: WNL       RIGHT C8: WNL      LEFT T1: WNL       RIGHT T1: WNL    LEFT L1 (lower pelvis/upper thigh): WNL    RIGHT L1: decreased in distribution of LFCN    LEFT L2 (upper thigh): WNL       RIGHT: L2:decreased in distribution of LFCN    LEFT L3 (medial knee): WNL       RIGHT L3: decreased in distribution of LFCN    LEFT L4 (superior medial malleolus): WNL       RIGHT L4: WNL      LEFT L5 (dorsal foot): WNL       RIGHT L5: WNL      LEFT S1 (lateral foot): WNL     RIGHT S1: WNL      LEFT S2 (popliteal fossa): WNL    RIGHT S2: WNL        Motor strength  LEFT C5 (elbow flexion): 5/5   RIGHT C5: 5/5  LEFT C6 (wrist extension): 5/5     RIGHT C6: 5/5  LEFT C7 (elbow extension): 5/5     RIGHT C7: 5/5  LEFT C8 (finger abduction): 5/5     RIGHT C8: 5/5  LEFT T1 (hand ): 5/5     RIGHT T1: 5/5    LEFT L2 (hip flexion): 5/5   RIGHT L2: 5/5  LEFT L3 (knee extension): 5/5     RIGHT L3: 5/5  LEFT L4 (dorsiflexion): 5/5     RIGHT L4: 5/5  LEFT L5 (EHL extension): 5/5     RIGHT L5: 5/5  LEFT S1 (plantarflexion): 5/5     RIGHT S1: 5/5  LEFT S2 (knee flexion): 5/5      RIGHT S2: 5/5    Special testing  Gill: neg BL  DTR unremarkable  Seated slump test neg BL  Clonus: neg BL  Babinski: neg BL    Psych: affect nl  Skin: no rash/lesions      REVIEW OF LABORATORY DATA  I have reviewed the following lab results:  WBC   Date Value Ref Range Status   02/20/2023 6.8 4.4 - 11.3 x10E9/L Final     RBC   Date Value Ref Range Status   02/20/2023 5.42 4.50 - 5.90 x10E12/L Final     Hemoglobin   Date Value Ref Range Status   02/20/2023 15.6 13.5 - 17.5 g/dL Final     Hematocrit   Date Value Ref Range Status   02/20/2023 48.8 41.0 - 52.0 % Final     MCV   Date Value Ref Range Status   02/20/2023 90 80 - 100 fL Final     MCH   Date Value Ref Range Status   01/16/2019 29.5 26 - 34 PG Final     MCHC   Date Value Ref Range Status   02/20/2023 32.0 32.0 - 36.0 g/dL Final     RDW   Date Value Ref Range Status  "  02/20/2023 13.3 11.5 - 14.5 % Final     Platelets   Date Value Ref Range Status   02/20/2023 320 150 - 450 x10E9/L Final     MPV   Date Value Ref Range Status   01/16/2019 9.5 7.0 - 12.6 CU Final     Sodium   Date Value Ref Range Status   12/05/2023 138 136 - 145 mmol/L Final     Potassium   Date Value Ref Range Status   12/05/2023 4.7 3.5 - 5.3 mmol/L Final     Bicarbonate   Date Value Ref Range Status   12/05/2023 26 21 - 32 mmol/L Final     Urea Nitrogen   Date Value Ref Range Status   12/05/2023 17 6 - 23 mg/dL Final     Calcium   Date Value Ref Range Status   12/05/2023 9.5 8.6 - 10.3 mg/dL Final     No results found for: \"PROTIME\", \"PTT\", \"INR\", \"FIBRINOGEN\"      REVIEW OF RADIOLOGY   I have reviewed the following:  Radiology Studies           MRI c-spine 11/11/19:  Alignment: There is straightening of the cervical spine.     Vertebrae/Intervertebral Discs: The vertebral bodies demonstrate  expected height. There is hypointense T1 and T2 signal in the dens  consistent with sclerosis due to chronic degenerative changes.  Otherwise, marrow signal is within normal limits. There is diffuse  desiccation of the intervertebral discs. Intervertebral disc heights  are maintained.     Cord: Spinal cord is normal in signal and caliber.     C1-C2: There is no central canal stenosis.     C2-C3: There is a disc osteophyte complex which partially effaces the  ventral thecal sac and causes overall mild spinal canal stenosis.  There is mild left uncovertebral hypertrophy and facet  osteoarthropathy. There is no significant narrowing of the neural  foramina. There is no right facet osteoarthropathy.     C3-C4: There is osteophyte disc complex, partially effacing the  ventral thecal sac and causing overall moderate spinal canal  stenosis. There are bilateral uncovertebral osteophytes, right  greater than left, and mild right facet osteoarthropathy. There is  resulting at most mild bilateral neural foraminal narrowing. There " is  no significant left-sided facet osteoarthropathy.     C4-C5: There is a disc osteophyte complex including a central disc  protrusion which causes marked spinal canal stenosis and indents the  ventral cord. There is mild bilateral neural foraminal narrowing due  to uncovertebral hypertrophy and facet osteoarthropathy.     C5-C6: There is moderate spinal canal stenosis due to disc osteophyte  complex. There is mild-to-moderate bilateral neural foraminal  narrowing due to uncovertebral hypertrophy and facet osteoarthropathy.     C6-C7: There is mild spinal canal stenosis due to disc osteophyte  complex. There is mild bilateral neural foraminal narrowing due to  uncovertebral hypertrophy. There is no significant facet  osteoarthropathy.     C7-T1: There is no posterior disc contour abnormality. There is no  spinal canal or neural foraminal stenosis. There is mild left facet  osteoarthropathy. There is no right facet osteoarthropathy.     There is a left paracentral disc protrusion at T2-T3 which partially  effaces the ventral thecal sac. There is no spinal canal stenosis.  The disc extends into the left neural foramen but causes no  significant neural foraminal narrowing. There is no narrowing of the  right neural foramen. There is mild left facet osteoarthropathy.     The prevertebral and posterior paraspinal soft tissues are  unremarkable.     IMPRESSION:  1. Multilevel degenerative changes in the cervical spine, most  pronounced at the level C4-C5, where there is marked spinal canal  stenosis. There is moderate spinal canal stenosis at C3-C4 and C5-C6  and mild spinal canal stenosis at C2-C3 and C6-C7. There is variable  degree of neural foraminal narrowing at multiple levels as detailed  above.           ASSESSMENT & PLAN  John Delatorre is a 63 y.o. old male with PMH obesity, HTN, CHON non-adherent to CPAP, pDM2 who presents for F/U LB pain       1) Lumbar PLS after L3-S1 lami 2000  -Relieved his BLE pain, but  worsening LBP radiating to L groin  -Refractive to yrs of conservative tx including Tylenol, NSAIDs, tizanidine, MDP, >6 w PT   -Reviewed/discussed MRI L-spine 1/18/24: stable L3-S1 lami, L2-3 disc bulge/LFH contributing to mild canal/BL NF stenosis, left mod-severe vs mild R NFS at L3-4 with disc osteophyte displacing nerve, mod BL NFS at L4-5, mod-sever eL>R NFS at L5-S1  -Last GIA 2019 by Dr Osorio supposedly gave excellent relief. Pt interested in repeating  -Schedule left L3-4, L4-5 TFESI to target pain generator as seen on imaging and minimize risk/likelihood of chronic opioid use and/or surgery  -Consider SCS if refractive     2) R meralgia paresthetica  -Objective numbness in distribution of R LFCN likely 2/2 obesity  -R LFNCB 11/30: 100% relief (and numbness) for 3 days  -Briefly discussed DRG, but pt would like to revisit at a later date    3) Neck pain  -Neck pain since 2019 radiating to BL shoulders associated with BL thumb numbness, left hand weakness without obj deficit most c/w radiculitis  -Refractive to >4 y conservative tx including rest  -MDP, PRN Tylenol, PRN ibuprofen, referral to PT    4) Obesity  Discussed pt's obesity: BMI 31.3. Discussed its potential affect on worsening chronic pain through mechanical stress as well as neuro-inflammatory chemicals. This is in addition to contribution to metabolic syndrome and overal health and perioperative risk. Seeing [facility] clinic and has lost 20 lb since initiation        Discussed procedure risks/benefits in detail with patient. Pt meets medical necessity for procedure due to failure of conservative measures. Reviewed procedural risks including bleeding, infection, nerve damage, paralysis. Also reviewed mitigating factors such as screening for infection/blood thinner use, sterile precautions, and image-guidance when applicable. All questions answered. Pt/guardian expressed understanding and choose to proceed            Gail Chow  MD  Anesthesiologist & Interventional Pain Physician   [facility] [place]  O: [phone]  F: [phone]  1:47 PM  01/31/24

## 2024-02-01 PROBLEM — M54.12 CERVICAL RADICULITIS: Status: ACTIVE | Noted: 2024-02-01

## 2024-02-01 ASSESSMENT — ENCOUNTER SYMPTOMS
GASTROINTESTINAL NEGATIVE: 1
EYES NEGATIVE: 1
MYALGIAS: 1
PSYCHIATRIC NEGATIVE: 1
HEMATOLOGIC/LYMPHATIC NEGATIVE: 1
WEAKNESS: 1
RESPIRATORY NEGATIVE: 1
ENDOCRINE NEGATIVE: 1
CONSTITUTIONAL NEGATIVE: 1
NECK STIFFNESS: 1
NUMBNESS: 1
CARDIOVASCULAR NEGATIVE: 1
BACK PAIN: 1

## 2024-02-08 ENCOUNTER — HOSPITAL ENCOUNTER (OUTPATIENT)
Dept: GASTROENTEROLOGY | Facility: HOSPITAL | Age: 64
Discharge: HOME | End: 2024-02-08
Payer: COMMERCIAL

## 2024-02-08 ENCOUNTER — HOSPITAL ENCOUNTER (OUTPATIENT)
Dept: RADIOLOGY | Facility: HOSPITAL | Age: 64
Discharge: HOME | End: 2024-02-08
Payer: COMMERCIAL

## 2024-02-08 VITALS
HEIGHT: 73 IN | DIASTOLIC BLOOD PRESSURE: 81 MMHG | RESPIRATION RATE: 18 BRPM | WEIGHT: 230 LBS | OXYGEN SATURATION: 100 % | BODY MASS INDEX: 30.48 KG/M2 | SYSTOLIC BLOOD PRESSURE: 112 MMHG | TEMPERATURE: 97.2 F | HEART RATE: 65 BPM

## 2024-02-08 DIAGNOSIS — M54.16 LUMBAR RADICULITIS: ICD-10-CM

## 2024-02-08 PROCEDURE — 76000 FLUOROSCOPY <1 HR PHYS/QHP: CPT

## 2024-02-08 PROCEDURE — 64483 NJX AA&/STRD TFRM EPI L/S 1: CPT | Performed by: ANESTHESIOLOGY

## 2024-02-08 PROCEDURE — 64484 NJX AA&/STRD TFRM EPI L/S EA: CPT | Performed by: ANESTHESIOLOGY

## 2024-02-08 ASSESSMENT — PAIN SCALES - GENERAL
PAINLEVEL_OUTOF10: 0 - NO PAIN
PAINLEVEL_OUTOF10: 1

## 2024-02-08 ASSESSMENT — COLUMBIA-SUICIDE SEVERITY RATING SCALE - C-SSRS
1. IN THE PAST MONTH, HAVE YOU WISHED YOU WERE DEAD OR WISHED YOU COULD GO TO SLEEP AND NOT WAKE UP?: NO
2. HAVE YOU ACTUALLY HAD ANY THOUGHTS OF KILLING YOURSELF?: NO
6. HAVE YOU EVER DONE ANYTHING, STARTED TO DO ANYTHING, OR PREPARED TO DO ANYTHING TO END YOUR LIFE?: NO

## 2024-02-08 ASSESSMENT — PAIN - FUNCTIONAL ASSESSMENT
PAIN_FUNCTIONAL_ASSESSMENT: 0-10
PAIN_FUNCTIONAL_ASSESSMENT: 0-10

## 2024-02-08 ASSESSMENT — PAIN DESCRIPTION - DESCRIPTORS: DESCRIPTORS: ACHING;DULL

## 2024-02-08 NOTE — INTERVAL H&P NOTE
H&P reviewed. The patient was examined and there are no changes to the H&P.  John Delatorre is a 63 y.o. male with PMH  obesity, HTN, CHON non-adherent to CPAP, pDM2 who presents for left L3-4, L4-5 TFESI to target pain generator as seen on imaging and minimize risk/likelihood of chronic opioid use and/or surgery.    Patient's pain stable and persistent from last visit.  Denies allergies to Latex, steroids, local anesthetics, or iodine/contrast. Denies being on blood thinners. Not diabetic.  Denies fever, chills, NS, CP, SOB, cough, N/V. Discussed procedure risks/benefits in detail with patient. Pt meets medical necessity for procedure due to failure of conservative measures. Reviewed procedural risks including bleeding, infection, nerve damage, paralysis. Also reviewed mitigating factors such as screening for infection/blood thinner use, sterile precautions, and image-guidance when applicable. All questions answered. Pt/guardian expressed understanding and choose to proceed      Gail Chow MD  Anesthesiologist & Interventional Pain Physician   [facility] [place]  O: [phone]  F: [phone]  9:37 AM  02/08/24

## 2024-02-08 NOTE — OP NOTE
Procedure: RIGHT L3-4, L4-5 transforaminal epidural steroid injection  [facility]     Initially planned left-sided approach, but due to persistent vascular uptake and as cannot perform ILESI 2/2 lami, right side injected instead with adequate epidural spread. Relayed to patient. Consider repeating injection on left side in future if relief inadequate.    Informed consent completed.  Patient brought to procedure suite and positioned on table prone.  Lower back prepped with chlorhexidine and draped in sterile fashion.  Fluoroscopy utilized to identify L3-4, L4-5 levels.  C-arm was obliqued to left side to visualize L3-4, L4-5 foramina.  1.5 inch 25-gauge needle used to administer 1% lidocaine for local anesthesia.  Then, 3.5 inch 22-gauge needle were advanced coaxially to superolateral aspect of foramina via x-ray guidance.  AP view utilized to visualize needles approaching junction of the transverse process and vertebral body.  Lateral view taken to visualize needles just posterior to spinal canal.  Needle was then advanced slightly and after negative add aspiration, 1.5 cc Omnipaque 240 administered at each level revealing adequate epidural spread without vascular uptake.  AP view taken to confirm epidural spread medially without vascular uptake.  Then, 7.5 mg dexamethasone +1 cc preservative-free saline injected at each level.       Total injectate: 15 mg dexamethasone, 2 ml PF NS     Needles re-styleted and removed.  Hemostasis achieved.  Band-Aids placed.  Patient tolerated procedure well and without issue.  They were discharged home in stable condition.      Gail Chow MD  Anesthesiologist & Interventional Pain Physician   [facility] [place]  O: [phone]  F: [phone]  9:38 AM  02/08/24

## 2024-02-08 NOTE — DISCHARGE INSTRUCTIONS
DISCHARGE INSTRUCTIONS FOR INJECTIONS     You underwent an epidural steroid injection today    Aftermost injections, it is recommended that you relax and limit your activity for the remainder of the day unless you have been told otherwise by your pain physician.  You should not drive a car, operate machinery, or make important legal decisions unless otherwise directed by your pain physician.  You may resume your normal activity, including exercise, tomorrow.      Keep a written pain diary of how much pain relief you experienced following the injection procedure and the length of time of pain relief you experienced pain relief. Following diagnostic injections like medial branch nerve blocks, sacroiliac joint blocks, stellate ganglion injections and other blocks, it is very important you record the specific amount of pain relief you experienced immediately after the injectionand how long it lasted. Your doctor will ask you for this information at your follow up visit.     For all injections, please keep the injection site dry and inspect the site for a couple of days. You may remove the Band-Aid the day of the injection at any time.     Some discomfort, bruising or slight swelling may occur at the injection site. This is not abnormal if it occurs.  If needed you may:    -Take over the counter medication such as Tylenol or Motrin.   -Apply an ice pack for 30 minutes, 2 to 3 times a day for the first 24 hours.     You may shower today; no soaking baths, hot tubs, whirlpools or swimming pools for two days.      If you are given steroids in your injection, it may take 3-5 days for the steroid medication to take effect. You may notice a worsening of your symptoms for 1-2 days after the injection. This is not abnormal.  You may use acetaminophen, ibuprofen, or prescription medication that your doctor may have prescribed for you if you need to do so.     A few common side effects of steroids include facial flushing,  sweating, restlessness, irritability,difficulty sleeping, increase in blood sugar, and increased blood pressure. If you have diabetes, please monitor your blood sugar at least once a day for at least 5 days. If you have poorly controlled high blood pressure, monitoryour blood pressure for at least 2 days and contact your primary care physician if these numbers are unusually high for you.      If you take aspirin or non-steroidal anti-inflammatory drugs (examples are Motrin, Advil, ibuprofen, Naprosyn, Voltaren, Relafen, etc.) you may restart these this evening, but stop taking it 3 days before your next appointment, unless instructed otherwiseby your physician.      You do not need to discontinue non-aspirin-containing pain medications prior to an injection (examples: Celebrex, tramadol, hydrocodone and acetaminophen).      If you take a blood thinning medication (Coumadin, Lovenox, Fragmin,Ticlid, Plavix, Pradaxa, etc.), please discuss this with your primary care physician/cardiologist and your pain physician. These medications MUST be discontinued before you can have an injection safely, without the risk of uncontrolled bleeding. If these medications are not discontinued for an appropriate period of time, you will not be able to receivean injection.      If you are taking Coumadin, please have your INR checked the morning of your procedure and bringthe result to your appointment unless otherwise instructed. If your INR is over 1.2, your injection may need to be rescheduled to avoid uncontrolled bleeding from the needle placement.     Call [facility] at [phone] between 8am-4pm Monday - Friday if you are experiencing the following:    If you received an epidural or spinal injection:    -Headache that doesnot go away with medicine, is worse when sitting or standing up, and is greatly relieved upon lying down.   -Severe pain worse than or different than your baseline pain.   -Chills or fever  (101º F or greater).   -Drainage or signs of infection at the injection site     Go directly to the Emergency Department if you are experiencing the following and received an epidural or spinal injection:   -Abrupt weakness or progressive weakness in your legs that starts after you leave the clinic.   -Abrupt severe or worsening numbness in your legs.   -Inability to urinate after the injection or loss of bowel or bladder control without the urge to defecate or urinate.     If you have a clinical question that cannot wait until your next appointment, please call [phone] between 8am-4pm Monday - Friday or send a [employer] message. We do our best to return all non-emergency messages within 24 hours, Monday - Friday. A nurse or physician will return your message.      If you need to cancel an appointment, please call the scheduling staff at [phone] during normal business hours or leave a message at least 24 hours in advance.     If you are going to be sedated for your next procedure, you MUST have responsible adult who can legally drive accompany you home. You cannot eat or drink for eight hours prior to the planned procedure if you are going to receive sedation. You may take your non-blood thinning medications with a small sip of water.

## 2024-03-04 PROBLEM — Z71.82 EXERCISE COUNSELING: Status: ACTIVE | Noted: 2024-03-04

## 2024-03-04 PROBLEM — R63.2 POLYPHAGIA: Status: ACTIVE | Noted: 2024-03-04

## 2024-03-04 PROBLEM — Z71.3 DIETARY COUNSELING: Status: ACTIVE | Noted: 2024-03-04

## 2024-03-04 NOTE — PROGRESS NOTES
"Subjective   Patient ID: John Delatorre \"Denilson\" is a 63 y.o. male who presents for Weight Management.    An interactive audio and video telecommunication system which permits real time communications between the patient (at the originating site) and provider (at the distant site) was utilized to provide this telehealth service.   Verbal consent was requested and obtained from John Delatorre on this date, 03/04/24 for a telehealth visit.     HPI 64 YO Male with Obesity presents for a Weight Management Follow-up. John was seen last month and and started on Ozempic for the treatment of Prediabetes, Metabolic Syndrome and Obesity. He is currently on 0.5mg weekly. He denies N/V/D/C or abdominal pain. 1500 calories per day.      Weight Hx:  Initial Weight: 249.6  Last Visit Weight: 241  Current Weight: 231  Total Loss: 18.6lbs     Diet Recall:  Breakfast: Protein Shake  Lunch: Lean Cuisine   Dinner: Chicken, Veggie   Snack(s): None      Current exercise routine: Walking; tracking with fit bit.     Review of Systems Unless noted in the HPI all other systems have been reviewed and are negative for complaint    Objective   Physical Exam Telehealth Visit  General- No acute distress, well appearing and well nourished.   Eyes Conjunctiva and lids: No erythema, swelling or discharge  Pulmonary - Respiratory effort: Normal respiration.   Neurologic - Coordination: Normal. A&Ox3.  Psychiatric - Orientation to person, place, and time: Normal. Mood and affect: Normal.    Assessment/Plan       TREATMENT PLAN:  64 YO Male with Obesity here for Weight Management.  Current BMI: 30  Total Loss: 18.6lbs  Just took his first injection of 0.5mg Ozempic.   Will hold off on any additional RX's until he completes 4 weeks at 0.5mg. Will follow-up in 4 weeks to reassess.   Patient denies personal or family hx of MTC, MENII or Pancreatitis.   Risks and benefits discussed.   > 50% of time spent counseling on the importance of following recommended " dietary modifications including: role of diet, low calorie and carbohydrate restrictions, limiting fast food and avoiding high sugary beverages.   Also discussed, the role of exercise with an ultimate goal of at least 250-300 minutes a week for weight loss and weight maintenance.  Follow-up in 4 weeks.

## 2024-03-05 ENCOUNTER — TELEMEDICINE (OUTPATIENT)
Dept: PAIN MEDICINE | Facility: CLINIC | Age: 64
End: 2024-03-05
Payer: COMMERCIAL

## 2024-03-05 VITALS — WEIGHT: 231 LBS | HEIGHT: 73 IN | BODY MASS INDEX: 30.62 KG/M2

## 2024-03-05 DIAGNOSIS — Z71.3 DIETARY COUNSELING: ICD-10-CM

## 2024-03-05 DIAGNOSIS — Z71.82 EXERCISE COUNSELING: ICD-10-CM

## 2024-03-05 DIAGNOSIS — E11.9 TYPE 2 DIABETES MELLITUS WITHOUT COMPLICATION, WITHOUT LONG-TERM CURRENT USE OF INSULIN (MULTI): ICD-10-CM

## 2024-03-05 DIAGNOSIS — R63.2 POLYPHAGIA: ICD-10-CM

## 2024-03-05 DIAGNOSIS — E66.09 CLASS 1 OBESITY DUE TO EXCESS CALORIES WITHOUT SERIOUS COMORBIDITY WITH BODY MASS INDEX (BMI) OF 30.0 TO 30.9 IN ADULT: Primary | ICD-10-CM

## 2024-03-05 PROCEDURE — 1036F TOBACCO NON-USER: CPT | Performed by: NURSE PRACTITIONER

## 2024-03-05 PROCEDURE — 3008F BODY MASS INDEX DOCD: CPT | Performed by: NURSE PRACTITIONER

## 2024-03-05 PROCEDURE — 99214 OFFICE O/P EST MOD 30 MIN: CPT | Performed by: NURSE PRACTITIONER

## 2024-03-05 PROCEDURE — 99401 PREV MED CNSL INDIV APPRX 15: CPT | Performed by: NURSE PRACTITIONER

## 2024-03-05 ASSESSMENT — PATIENT HEALTH QUESTIONNAIRE - PHQ9
1. LITTLE INTEREST OR PLEASURE IN DOING THINGS: NOT AT ALL
SUM OF ALL RESPONSES TO PHQ9 QUESTIONS 1 AND 2: 0
2. FEELING DOWN, DEPRESSED OR HOPELESS: NOT AT ALL

## 2024-03-05 ASSESSMENT — PAIN - FUNCTIONAL ASSESSMENT: PAIN_FUNCTIONAL_ASSESSMENT: NO/DENIES PAIN

## 2024-03-05 NOTE — PATIENT INSTRUCTIONS
Medication:   You have been prescribed Ozempic, off-label, for the treatment of Insulin Resistance and Metabolic Syndrome.  Ozempic is a Glucagon-like peptide-1 (GLP-1) receptor agonist indicated as an adjunct to a reduced calorie diet and increased physical activity for chronic weight management in adults.   This medication works by decreasing the appetite and increased feeling of fullness.   Administer Ozempic once weekly, on the same day each week, at any time of day, with or without meals.   Inject subcutaneously in the abdomen, thigh or upper arm.  The initial dose for Ozempic is 0.25 mg weekly x 4 weeks, then  Increase to 0.5mg weekly x 4 weeks then we will touch base.   Possible Side Effects Include: Nausea, Diarrhea, Vomiting, Constipation, Abdominal Pain, Headache, Fatigue, Dyspepsia, Dizziness, Abdominal Distention, Hypoglycemia (in patients with Type II Diabetes), flatulence, gastroenteritis, and GERD.    *For Constipation--> take a fiber supplement or stool softener daily. Make sure you are drinking at least 64oz of water daily.  *For Nausea--> Eat small, frequent meals throughout the day. If nausea persists, please call the office.    Diet Recommendations:  - Keep a food journal and log everything you eat and drink. You can use a phone applications like [employer], [employer] it, a notebook, or the provided meal calendar.   - Eat between 1800-9140 calories per day; meal plan provided to you this visit.  - Consume less than 100 carbohydrates per day. Examples of carbohydrates include: bread, pasta, cakes, cookies, rice, cereal, fruit drinks, juice, soda and fruit.   - Consume no more than 1 fruit per day.   - Eat 3 meals and 1 snack. Each meal should have 3-4 oz of protein and vegetables. Limit starches.  - Eat on a schedule and do not skip meals.    Exercise Recommendations:   - Aim for 30 minutes per day, 5 days per week to start, with progression over several weeks to more vigorous intensity.   -  Emphasize increasing duration, rather than intensity initially.   - Moderate-intensity physical activity includes:  * Brisk Walking  * Biking (slower than 10 MPH)  * Water Aerobics  * Vigorous housework (washing windows, vacuuming, mopping)  * Mowing the lawn (push mower)  * Gardening  * Ballroom dancing    Follow-up on April 1 at 0845 in the [place] office.

## 2024-03-13 ENCOUNTER — OFFICE VISIT (OUTPATIENT)
Dept: PAIN MEDICINE | Facility: CLINIC | Age: 64
End: 2024-03-13
Payer: COMMERCIAL

## 2024-03-13 VITALS
DIASTOLIC BLOOD PRESSURE: 68 MMHG | HEIGHT: 73 IN | BODY MASS INDEX: 30.62 KG/M2 | RESPIRATION RATE: 18 BRPM | OXYGEN SATURATION: 99 % | SYSTOLIC BLOOD PRESSURE: 102 MMHG | HEART RATE: 71 BPM | WEIGHT: 231 LBS

## 2024-03-13 DIAGNOSIS — E66.09 CLASS 1 OBESITY DUE TO EXCESS CALORIES WITHOUT SERIOUS COMORBIDITY WITH BODY MASS INDEX (BMI) OF 30.0 TO 30.9 IN ADULT: ICD-10-CM

## 2024-03-13 DIAGNOSIS — M54.50 CHRONIC LOW BACK PAIN, UNSPECIFIED BACK PAIN LATERALITY, UNSPECIFIED WHETHER SCIATICA PRESENT: ICD-10-CM

## 2024-03-13 DIAGNOSIS — G89.29 CHRONIC LOW BACK PAIN, UNSPECIFIED BACK PAIN LATERALITY, UNSPECIFIED WHETHER SCIATICA PRESENT: ICD-10-CM

## 2024-03-13 DIAGNOSIS — M96.1 LUMBAR POSTLAMINECTOMY SYNDROME: Primary | ICD-10-CM

## 2024-03-13 DIAGNOSIS — G57.11 MERALGIA PARESTHETICA OF RIGHT SIDE: ICD-10-CM

## 2024-03-13 DIAGNOSIS — M54.12 CERVICAL RADICULITIS: ICD-10-CM

## 2024-03-13 PROCEDURE — 1036F TOBACCO NON-USER: CPT | Performed by: ANESTHESIOLOGY

## 2024-03-13 PROCEDURE — 3008F BODY MASS INDEX DOCD: CPT | Performed by: ANESTHESIOLOGY

## 2024-03-13 PROCEDURE — 99213 OFFICE O/P EST LOW 20 MIN: CPT | Performed by: ANESTHESIOLOGY

## 2024-03-13 PROCEDURE — 3078F DIAST BP <80 MM HG: CPT | Performed by: ANESTHESIOLOGY

## 2024-03-13 PROCEDURE — 3074F SYST BP LT 130 MM HG: CPT | Performed by: ANESTHESIOLOGY

## 2024-03-13 PROCEDURE — 99401 PREV MED CNSL INDIV APPRX 15: CPT | Performed by: ANESTHESIOLOGY

## 2024-03-13 ASSESSMENT — ENCOUNTER SYMPTOMS
NUMBNESS: 1
BACK PAIN: 1
MYALGIAS: 1
HEMATOLOGIC/LYMPHATIC NEGATIVE: 1
CARDIOVASCULAR NEGATIVE: 1
GASTROINTESTINAL NEGATIVE: 1
CONSTITUTIONAL NEGATIVE: 1
PSYCHIATRIC NEGATIVE: 1
EYES NEGATIVE: 1
NECK STIFFNESS: 1
RESPIRATORY NEGATIVE: 1
ENDOCRINE NEGATIVE: 1
WEAKNESS: 1

## 2024-03-13 ASSESSMENT — PAIN DESCRIPTION - DESCRIPTORS: DESCRIPTORS: ACHING

## 2024-03-13 ASSESSMENT — PAIN - FUNCTIONAL ASSESSMENT: PAIN_FUNCTIONAL_ASSESSMENT: 0-10

## 2024-03-13 ASSESSMENT — PAIN SCALES - GENERAL
PAINLEVEL_OUTOF10: 2
PAINLEVEL: 2

## 2024-03-13 NOTE — PROGRESS NOTES
PAIN MANAGEMENT FOLLOW-UP OFFICE NOTE    Date of Service: 3/13/2024    SUBJECTIVE    CHIEF COMPLAINT: LBP    HISTORY OF PRESENT ILLNESS    John Delatorre is a 63 y.o. male with PMH  obesity, HTN, CHON non-adherent to CPAP, pDM2 who presents for follow-up LB pain.    On 2/8, pt underwent Herminia with 100% ongoing relief of LLE pain. Since that time, pt reports LB and BLE pains controlled. RLE pain rarely occurs. Neck pain stable. He would like to continue PT.     Pt denies new-onset numbness, weakness, bowel/bladder incontinence.  Pt denies recent infection, allergy to Latex/iodine/contrast. Patient is currently taking the following blood thinner(s): N/A    Procedure Log:  -HERMINIA 2/8/24: 100% ongoing relief  -R LFCNB 11/30/23: 100% relief (and numbness) for 3 days    REVIEW OF SYSTEMS  Review of Systems   Constitutional: Negative.    HENT: Negative.     Eyes: Negative.    Respiratory: Negative.     Cardiovascular: Negative.    Gastrointestinal: Negative.    Endocrine: Negative.    Musculoskeletal:  Positive for back pain, myalgias and neck stiffness.   Skin: Negative.    Neurological:  Positive for weakness and numbness.   Hematological: Negative.    Psychiatric/Behavioral: Negative.         PAST MEDICAL HISTORY  Past Medical History:   Diagnosis Date    Personal history of other endocrine, nutritional and metabolic disease 03/01/2019    History of diabetes mellitus    Personal history of other endocrine, nutritional and metabolic disease 03/01/2019    History of hyperlipidemia     Past Surgical History:   Procedure Laterality Date    ABDOMINAL SURGERY      BACK SURGERY  2000    HERNIA REPAIR      OTHER SURGICAL HISTORY  03/01/2019    Back surgery    SPINE SURGERY      TONSILLECTOMY      US GUIDED PERCUTANEOUS PLACEMENT  11/30/2023    US GUIDED PERCUTANEOUS PLACEMENT 11/30/2023 [facility]     Family History   Problem Relation Name Age of Onset    Hyperlipidemia Mother Radha     Hypertension Mother Radha     Stroke Mother  "Radha         CVA's    Skin cancer Mother Radha     Arthritis Mother Radha     Cancer Mother Radha     Diabetes Father David     Heart disease Father David     Leukemia Father David         Acute myeloid leukemia    Arthritis Father David     Hypertension Sister Kathe     Hyperlipidemia Sister Kathe     Diabetes Sister Kathe     Obesity Sister Kathe        CURRENT MEDICATIONS  Current Outpatient Medications   Medication Sig Dispense Refill    hydrOXYzine HCL (Atarax) 10 mg tablet Take 1 tablet (10 mg) by mouth once daily as needed for anxiety. 30 tablet 1    lidocaine (Xylocaine) 5 % ointment Apply topically every 12 hours.      Lidoderm 5 % patch Place 1 patch on the skin once daily.      semaglutide (Ozempic) 0.25 mg or 0.5 mg (2 mg/3 mL) pen injector Inject 0.25 mg under the skin every 7 days for 28 days, THEN 0.5 mg every 7 days for 28 days. 3 mL 1     No current facility-administered medications for this visit.       ALLERGIES AND DRUG REACTIONS  Allergies   Allergen Reactions    Atorvastatin Other     Abdominal pain     Gemfibrozil Unknown    Niacin Other     Rectal bleeding     Rosuvastatin Other     Abdominal pain           OBJECTIVE  Visit Vitals  /68   Pulse 71   Resp 18   Ht 1.854 m (6' 1\")   Wt 105 kg (231 lb)   SpO2 99%   BMI 30.48 kg/m²   Smoking Status Never   BSA 2.33 m²       Last Recorded Pain Score (if available):                Physical Exam  General: Sitting in chair, NAD  Head: NCAT  Eyes: Sclera/conjunctiva clear, EOMI, PERRL  Nose/mouth: MMM  CV: Good distal pulses  Lungs: Good/equal chest excursion  Abdomen: Soft, ND  Ext: No cyanosis/edema  MSK: L-spine alignment: WNL, BL paraspinal m NTTP, L-spine ROM: full     Neuro: AAOx3   Dermatome sensation to light touch  LEFT L1 (lower pelvis/upper thigh): WNL    RIGHT L1: WNL  LEFT L2 (upper thigh): WNL       RIGHT: L2:WNL  LEFT L3 (medial knee): WNL       RIGHT L3: WNL  LEFT L4 (superior medial malleolus): WNL       RIGHT L4: " "WNL      LEFT L5 (dorsal foot): WNL       RIGHT L5: WNL      LEFT S1 (lateral foot): WNL     RIGHT S1: WNL      LEFT S2 (popliteal fossa): WNL    RIGHT S2: WNL        Motor strength  LEFT L2 (hip flexion): 5/5   RIGHT L2: 5/5  LEFT L3 (knee extension): 5/5     RIGHT L3: 5/5  LEFT L4 (dorsiflexion): 5/5     RIGHT L4: 5/5  LEFT L5 (EHL extension): 5/5     RIGHT L5: 5/5  LEFT S1 (plantarflexion): 5/5     RIGHT S1: 5/5  LEFT S2 (knee flexion): 5/5      RIGHT S2: 5/5    Special testing  DTR unremarkable  Seated slump test neg BL    Psych: affect nl  Skin: no rash/lesions      REVIEW OF LABORATORY DATA  I have reviewed the following lab results:  WBC   Date Value Ref Range Status   02/20/2023 6.8 4.4 - 11.3 x10E9/L Final     RBC   Date Value Ref Range Status   02/20/2023 5.42 4.50 - 5.90 x10E12/L Final     Hemoglobin   Date Value Ref Range Status   02/20/2023 15.6 13.5 - 17.5 g/dL Final     Hematocrit   Date Value Ref Range Status   02/20/2023 48.8 41.0 - 52.0 % Final     MCV   Date Value Ref Range Status   02/20/2023 90 80 - 100 fL Final     MCH   Date Value Ref Range Status   01/16/2019 29.5 26 - 34 PG Final     MCHC   Date Value Ref Range Status   02/20/2023 32.0 32.0 - 36.0 g/dL Final     RDW   Date Value Ref Range Status   02/20/2023 13.3 11.5 - 14.5 % Final     Platelets   Date Value Ref Range Status   02/20/2023 320 150 - 450 x10E9/L Final     MPV   Date Value Ref Range Status   01/16/2019 9.5 7.0 - 12.6 CU Final     Sodium   Date Value Ref Range Status   12/05/2023 138 136 - 145 mmol/L Final     Potassium   Date Value Ref Range Status   12/05/2023 4.7 3.5 - 5.3 mmol/L Final     Bicarbonate   Date Value Ref Range Status   12/05/2023 26 21 - 32 mmol/L Final     Urea Nitrogen   Date Value Ref Range Status   12/05/2023 17 6 - 23 mg/dL Final     Calcium   Date Value Ref Range Status   12/05/2023 9.5 8.6 - 10.3 mg/dL Final     No results found for: \"PROTIME\", \"PTT\", \"INR\", \"FIBRINOGEN\"      REVIEW OF RADIOLOGY   I have " reviewed the following:  Radiology Studies           MRI c-spine 11/11/19:  Alignment: There is straightening of the cervical spine.     Vertebrae/Intervertebral Discs: The vertebral bodies demonstrate  expected height. There is hypointense T1 and T2 signal in the dens  consistent with sclerosis due to chronic degenerative changes.  Otherwise, marrow signal is within normal limits. There is diffuse  desiccation of the intervertebral discs. Intervertebral disc heights  are maintained.     Cord: Spinal cord is normal in signal and caliber.     C1-C2: There is no central canal stenosis.     C2-C3: There is a disc osteophyte complex which partially effaces the  ventral thecal sac and causes overall mild spinal canal stenosis.  There is mild left uncovertebral hypertrophy and facet  osteoarthropathy. There is no significant narrowing of the neural  foramina. There is no right facet osteoarthropathy.     C3-C4: There is osteophyte disc complex, partially effacing the  ventral thecal sac and causing overall moderate spinal canal  stenosis. There are bilateral uncovertebral osteophytes, right  greater than left, and mild right facet osteoarthropathy. There is  resulting at most mild bilateral neural foraminal narrowing. There is  no significant left-sided facet osteoarthropathy.     C4-C5: There is a disc osteophyte complex including a central disc  protrusion which causes marked spinal canal stenosis and indents the  ventral cord. There is mild bilateral neural foraminal narrowing due  to uncovertebral hypertrophy and facet osteoarthropathy.     C5-C6: There is moderate spinal canal stenosis due to disc osteophyte  complex. There is mild-to-moderate bilateral neural foraminal  narrowing due to uncovertebral hypertrophy and facet osteoarthropathy.     C6-C7: There is mild spinal canal stenosis due to disc osteophyte  complex. There is mild bilateral neural foraminal narrowing due to  uncovertebral hypertrophy. There is no  significant facet  osteoarthropathy.     C7-T1: There is no posterior disc contour abnormality. There is no  spinal canal or neural foraminal stenosis. There is mild left facet  osteoarthropathy. There is no right facet osteoarthropathy.     There is a left paracentral disc protrusion at T2-T3 which partially  effaces the ventral thecal sac. There is no spinal canal stenosis.  The disc extends into the left neural foramen but causes no  significant neural foraminal narrowing. There is no narrowing of the  right neural foramen. There is mild left facet osteoarthropathy.     The prevertebral and posterior paraspinal soft tissues are  unremarkable.     IMPRESSION:  1. Multilevel degenerative changes in the cervical spine, most  pronounced at the level C4-C5, where there is marked spinal canal  stenosis. There is moderate spinal canal stenosis at C3-C4 and C5-C6  and mild spinal canal stenosis at C2-C3 and C6-C7. There is variable  degree of neural foraminal narrowing at multiple levels as detailed  above.           ASSESSMENT & PLAN  John Delatorre is a 63 y.o. old male with PMH obesity, HTN, CHON non-adherent to CPAP, pDM2 who presents for F/U LB pain       1) Lumbar PLS after L3-S1 lami 2000  -Relieved his BLE pain, but worsening LBP radiating to L groin  -Refractive to yrs of conservative tx including Tylenol, NSAIDs, tizanidine, MDP, >6 w PT   -MRI L-spine 1/18/24: stable L3-S1 lami, L2-3 disc bulge/LFH contributing to mild canal/BL NF stenosis, left mod-severe vs mild R NFS at L3-4 with disc osteophyte displacing nerve, mod BL NFS at L4-5, mod-sever eL>R NFS at L5-S1  -GIA 2/8/24: 100% ongoing relief  -F/U 3 mo  -Consider SCS if refractive     2) R meralgia paresthetica, improved  -Objective numbness in distribution of R LFCN likely 2/2 obesity  -R LFNCB 11/30: 100% relief (and numbness) for 3 days  -resolved objective R thigh numbness  -Briefly discussed DRG, but pt would like to revisit at a later date    3) Neck  pain, improving  -Neck pain since 2019 radiating to BL shoulders associated with BL thumb numbness, left hand weakness without obj deficit most c/w radiculitis  -Refractive to >4 y conservative tx including rest, MDP, Tylenol, ibuprofen  -Cont PT    4) Obesity  Discussed pt's obesity: BMI 31.3> 30.5. Discussed its potential affect on worsening chronic pain through mechanical stress as well as neuro-inflammatory chemicals. This is in addition to contribution to metabolic syndrome and overal health and perioperative risk. Seeing [facility] and has lost >20 lb since initiation        Discussed procedure risks/benefits in detail with patient. Pt meets medical necessity for procedure due to failure of conservative measures. Reviewed procedural risks including bleeding, infection, nerve damage, paralysis. Also reviewed mitigating factors such as screening for infection/blood thinner use, sterile precautions, and image-guidance when applicable. All questions answered. Pt/guardian expressed understanding and choose to proceed            Gail Chow MD  Anesthesiologist & Interventional Pain Physician   [facility] [place]  O: [phone]  F: [phone]  1:45 PM  03/13/24

## 2024-03-27 NOTE — PROGRESS NOTES
"Subjective   Patient ID: John Delatorre \"Denilson\" is a 63 y.o. male who presents for Weight Management.    HPI 62 YO Male with Obesity presents for a Weight Management Follow-up. John was seen last month and and started on Ozempic for the treatment of Diabetes, Metabolic Syndrome and Obesity. He is currently on 0.5mg weekly. He denies N/V/D/C or abdominal pain.     Weight Hx:  Initial Weight: 249.6  Last Visit Weight: 231  Current Weight: 226.4lb  Total Loss: 23.2lb     Diet Recall:  Breakfast:   Lunch: Delightful bread toast  Dinner: Ham, cheezy potatoes, buns, banana cake  Snack(s): Cheese sticks     Current exercise routine: Walking; tracking with fit bit.     Review of Systems Unless noted in the HPI all other systems have been reviewed and are negative for complaint.    Objective   Physical Exam  General- No acute distress, well appearing and well nourished. Obese  Eyes Conjunctiva and lids: No erythema, swelling or discharge  Neck - Supple, no cervical lymphadenopathy.   Pulmonary - Respiratory effort: Normal respiration.   Cardiovascular - Normal rate and rhythm.  Examination of extremities for edema and/or varicosities: No peripheral edema  Abdomen: Soft, Non-tender, non-distended, no abdominal masses.   Musculoskeletal - Range of motion: normal  Skin - Skin and subcutaneous tissue: Normal without rashes or lesions.  Neurologic - Reflexes: Normal. Coordination: Normal gait   Psychiatric - Orientation to person, place, and time: Normal. Mood and affect: Normal    Assessment/Plan   Problem List Items Addressed This Visit       Obesity    Type 2 diabetes mellitus without complication, without long-term current use of insulin (CMS/[facility])    Relevant Medications    semaglutide (Ozempic) 0.25 mg or 0.5 mg (2 mg/3 mL) pen injector    Dietary counseling    Exercise counseling - Primary    Polyphagia       TREATMENT PLAN:  62 YO Male with Obesity here for Weight Management.  Current BMI: 29  Total Loss: 23.2lb  Currently " on 0.5mg weekly of Ozempic; denies adverse effects.   Will continue Ozempic  Patient denies personal or family hx of MTC, MENII or Pancreatitis.   Risks and benefits discussed.   > 50% of time spent counseling on the importance of following recommended dietary modifications including: role of diet, low calorie and carbohydrate restrictions, limiting fast food and avoiding high sugary beverages.   Also discussed, the role of exercise with an ultimate goal of at least 250-300 minutes a week for weight loss and weight maintenance.  Follow-up in 8 weeks or sooner, as needed.

## 2024-03-30 DIAGNOSIS — E11.9 TYPE 2 DIABETES MELLITUS WITHOUT COMPLICATION, WITHOUT LONG-TERM CURRENT USE OF INSULIN (MULTI): ICD-10-CM

## 2024-04-01 ENCOUNTER — OFFICE VISIT (OUTPATIENT)
Dept: PAIN MEDICINE | Facility: CLINIC | Age: 64
End: 2024-04-01
Payer: COMMERCIAL

## 2024-04-01 VITALS
HEIGHT: 73 IN | DIASTOLIC BLOOD PRESSURE: 72 MMHG | OXYGEN SATURATION: 92 % | BODY MASS INDEX: 30 KG/M2 | HEART RATE: 77 BPM | RESPIRATION RATE: 16 BRPM | WEIGHT: 226.4 LBS | SYSTOLIC BLOOD PRESSURE: 108 MMHG

## 2024-04-01 DIAGNOSIS — E66.09 CLASS 1 OBESITY DUE TO EXCESS CALORIES WITHOUT SERIOUS COMORBIDITY WITH BODY MASS INDEX (BMI) OF 30.0 TO 30.9 IN ADULT: ICD-10-CM

## 2024-04-01 DIAGNOSIS — R63.2 POLYPHAGIA: ICD-10-CM

## 2024-04-01 DIAGNOSIS — Z71.82 EXERCISE COUNSELING: Primary | ICD-10-CM

## 2024-04-01 DIAGNOSIS — Z71.3 DIETARY COUNSELING: ICD-10-CM

## 2024-04-01 DIAGNOSIS — E11.9 TYPE 2 DIABETES MELLITUS WITHOUT COMPLICATION, WITHOUT LONG-TERM CURRENT USE OF INSULIN (MULTI): ICD-10-CM

## 2024-04-01 PROCEDURE — 3008F BODY MASS INDEX DOCD: CPT | Performed by: NURSE PRACTITIONER

## 2024-04-01 PROCEDURE — 99401 PREV MED CNSL INDIV APPRX 15: CPT | Performed by: NURSE PRACTITIONER

## 2024-04-01 PROCEDURE — 99214 OFFICE O/P EST MOD 30 MIN: CPT | Performed by: NURSE PRACTITIONER

## 2024-04-01 PROCEDURE — 3074F SYST BP LT 130 MM HG: CPT | Performed by: NURSE PRACTITIONER

## 2024-04-01 PROCEDURE — 3078F DIAST BP <80 MM HG: CPT | Performed by: NURSE PRACTITIONER

## 2024-04-01 PROCEDURE — 1036F TOBACCO NON-USER: CPT | Performed by: NURSE PRACTITIONER

## 2024-04-01 RX ORDER — SEMAGLUTIDE 0.68 MG/ML
INJECTION, SOLUTION SUBCUTANEOUS
Qty: 3 ML | Refills: 0 | OUTPATIENT
Start: 2024-04-01

## 2024-04-01 RX ORDER — SEMAGLUTIDE 0.68 MG/ML
0.5 INJECTION, SOLUTION SUBCUTANEOUS
Qty: 3 ML | Refills: 1 | Status: SHIPPED | OUTPATIENT
Start: 2024-04-01 | End: 2024-05-22

## 2024-04-01 RX ORDER — SEMAGLUTIDE 0.68 MG/ML
0.5 INJECTION, SOLUTION SUBCUTANEOUS
Qty: 3 ML | Refills: 1 | Status: SHIPPED | OUTPATIENT
Start: 2024-04-01 | End: 2024-04-01 | Stop reason: SDUPTHER

## 2024-04-01 ASSESSMENT — PAIN - FUNCTIONAL ASSESSMENT: PAIN_FUNCTIONAL_ASSESSMENT: NO/DENIES PAIN

## 2024-05-01 PROBLEM — Z71.3 DIETARY COUNSELING: Status: RESOLVED | Noted: 2024-03-04 | Resolved: 2024-05-01

## 2024-05-01 PROBLEM — Z71.82 EXERCISE COUNSELING: Status: RESOLVED | Noted: 2024-03-04 | Resolved: 2024-05-01

## 2024-05-01 PROBLEM — R63.2 POLYPHAGIA: Status: RESOLVED | Noted: 2024-03-04 | Resolved: 2024-05-01

## 2024-05-03 ENCOUNTER — OFFICE VISIT (OUTPATIENT)
Dept: PRIMARY CARE | Facility: CLINIC | Age: 64
End: 2024-05-03
Payer: COMMERCIAL

## 2024-05-03 VITALS
BODY MASS INDEX: 30.35 KG/M2 | HEART RATE: 67 BPM | SYSTOLIC BLOOD PRESSURE: 102 MMHG | DIASTOLIC BLOOD PRESSURE: 64 MMHG | OXYGEN SATURATION: 97 % | WEIGHT: 229 LBS | HEIGHT: 73 IN

## 2024-05-03 DIAGNOSIS — E78.2 COMBINED HYPERLIPIDEMIA: Primary | ICD-10-CM

## 2024-05-03 DIAGNOSIS — E11.9 TYPE 2 DIABETES MELLITUS WITHOUT COMPLICATION, WITHOUT LONG-TERM CURRENT USE OF INSULIN (MULTI): ICD-10-CM

## 2024-05-03 PROCEDURE — 3008F BODY MASS INDEX DOCD: CPT | Performed by: FAMILY MEDICINE

## 2024-05-03 PROCEDURE — 99214 OFFICE O/P EST MOD 30 MIN: CPT | Performed by: FAMILY MEDICINE

## 2024-05-03 PROCEDURE — 3074F SYST BP LT 130 MM HG: CPT | Performed by: FAMILY MEDICINE

## 2024-05-03 PROCEDURE — 3078F DIAST BP <80 MM HG: CPT | Performed by: FAMILY MEDICINE

## 2024-05-03 PROCEDURE — 1036F TOBACCO NON-USER: CPT | Performed by: FAMILY MEDICINE

## 2024-05-03 RX ORDER — SIMVASTATIN 10 MG/1
10 TABLET, FILM COATED ORAL EVERY EVENING
Qty: 90 TABLET | Refills: 1 | Status: SHIPPED | OUTPATIENT
Start: 2024-05-03

## 2024-05-03 ASSESSMENT — COLUMBIA-SUICIDE SEVERITY RATING SCALE - C-SSRS
6. HAVE YOU EVER DONE ANYTHING, STARTED TO DO ANYTHING, OR PREPARED TO DO ANYTHING TO END YOUR LIFE?: NO
1. IN THE PAST MONTH, HAVE YOU WISHED YOU WERE DEAD OR WISHED YOU COULD GO TO SLEEP AND NOT WAKE UP?: NO
2. HAVE YOU ACTUALLY HAD ANY THOUGHTS OF KILLING YOURSELF?: NO

## 2024-05-03 ASSESSMENT — PAIN SCALES - GENERAL: PAINLEVEL: 0-NO PAIN

## 2024-05-03 NOTE — PROGRESS NOTES
"Subjective   Patient ID: Denilson Delatorre is a 63 y.o. male who presents for Diabetes (A1c- 6.0).      Osteoarthritis/Pain Management  -History of: Post Laminectomy Syndrome  -F/U: Seeing pain management - had epidural injection.  Had MRI.  Pt had injections recently - doing well.  Pain manageable.    -Treatment: Epidural injection  -Past Evaluation: Mri, xrays  -Specialist: Cindy Castaneda  -Past Medications:    For DM2:  -A1c: 6  -Follow up:  Seeing Linda Mota - started on ozempic.  Still on ozempic - weight has been stable.  Walking more.  Walking 2-3 miles.  Eating < 1500 calories below.  Weight watchers cooking.  Initially waist size changed - plateau.    -Hypoglycemia: none  -Glucose monitoring: none    Dyslipidemia  -F/U:  Pt is on simvastatin - was not on during blood work.    -Compliance with treatment thus far has been good.               Diabetes         Review of Systems    Objective   /64 (BP Location: Right arm, Patient Position: Sitting, BP Cuff Size: Large adult)   Pulse 67   Ht 1.854 m (6' 1\")   Wt 104 kg (229 lb)   SpO2 97%   BMI 30.21 kg/m²     Physical Exam  Vitals reviewed.   Constitutional:       General: He is not in acute distress.  Cardiovascular:      Rate and Rhythm: Normal rate and regular rhythm.      Pulses:           Dorsalis pedis pulses are 2+ on the right side and 2+ on the left side.        Posterior tibial pulses are 2+ on the right side and 2+ on the left side.   Pulmonary:      Effort: Pulmonary effort is normal.      Breath sounds: No wheezing or rhonchi.   Musculoskeletal:      Right lower leg: No edema.      Left lower leg: No edema.      Right foot: No deformity.      Left foot: No deformity.   Feet:      Right foot:      Protective Sensation: 5 sites tested.  5 sites sensed.      Skin integrity: No ulcer.      Left foot:      Protective Sensation: 5 sites tested.  5 sites sensed.      Skin integrity: No ulcer.   Lymphadenopathy:      Cervical: No " cervical adenopathy.   Neurological:      Mental Status: He is alert.         Assessment/Plan   Diagnoses and all orders for this visit:  Combined hyperlipidemia  Type 2 diabetes mellitus without complication, without long-term current use of insulin (Multi)    Patient Instructions   Here for follow up.      For DM2 - A1c is 6.  Up to date on foot exam.  Due to eye exam and urine testing.  Consider increasing ozempic to 1mg    For cholesterol - elevated - continue simvastatin 10mg.  Helps prevent atherosclerosis.  Recheck cholesteorl    Follow up in 6 months.

## 2024-05-03 NOTE — PATIENT INSTRUCTIONS
Here for follow up.      For DM2 - A1c is 6.  Up to date on foot exam.  Due to eye exam and urine testing.  Consider increasing ozempic to 1mg    For cholesterol - elevated - continue simvastatin 10mg.  Helps prevent atherosclerosis.  Recheck cholesteorl    Follow up in 6 months.

## 2024-06-16 PROBLEM — R63.2 POLYPHAGIA: Status: ACTIVE | Noted: 2024-06-16

## 2024-06-16 PROBLEM — Z71.82 EXERCISE COUNSELING: Status: ACTIVE | Noted: 2024-06-16

## 2024-06-16 PROBLEM — Z71.3 DIETARY COUNSELING: Status: ACTIVE | Noted: 2024-06-16

## 2024-06-16 NOTE — PROGRESS NOTES
"Subjective   Patient ID: John Delatorre \"Denilson\" is a 63 y.o. male who presents for No chief complaint on file..    HPI 62 YO Male with Obesity presents for a Weight Management Follow-up. John was seen last month and and started on Ozempic for the treatment of Diabetes, Metabolic Syndrome and Obesity. He is currently on 0.5mg weekly. He denies N/V/D/C or abdominal pain. He reports a 6 week plateau and would like to see if Mounjaro could be started instead.      Weight Hx:  Initial Weight: 249.6  Last Visit Weight: 226.4  Current Weight: 223.2   Total Loss:  26.4lbs    Diet Recall:  Breakfast: Scrambled eggs  Lunch: Ham sandwich, potato chips, limeade   Dinner: ribs and salad  Snack(s): none    Current Exercise Regimen: Intermittent walking    Objective   Physical Exam   General- No acute distress, well appearing and well nourished. Obese  Eyes Conjunctiva and lids: No erythema, swelling or discharge  Neck - Supple, no cervical lymphadenopathy.   Pulmonary - Respiratory effort: Normal respiration.   Cardiovascular - Normal rate and rhythm.  Examination of extremities for edema and/or varicosities: No peripheral edema  Abdomen: Soft, Non-tender, non-distended, no abdominal masses.   Musculoskeletal - Range of motion: normal  Skin - Skin and subcutaneous tissue: Normal without rashes or lesions.  Neurologic - Reflexes: Normal. Coordination: Normal gait   Psychiatric - Orientation to person, place, and time: Normal. Mood and affect: Normal    Assessment/Plan   Problem List Items Addressed This Visit       Obesity    Type 2 diabetes mellitus without complication, without long-term current use of insulin (Multi) - Primary    Relevant Medications    tirzepatide (Mounjaro) 5 mg/0.5 mL pen injector    Dietary counseling    Exercise counseling    Polyphagia     TREATMENT PLAN:  62 YO Male with Obesity here for Weight Management.  Current BMI: 29  Total Loss: 26.4lbs  Currently on 0.5mg weekly of Ozempic; denies adverse effects " but has been in a plateau for the past 6 weeks.   Will try to switch to Mounjaro as I feel this will help more with his weight loss.   Patient denies personal or family hx of MTC, MENII or Pancreatitis.   Risks and benefits discussed.   > 50% of time spent counseling on the importance of following recommended dietary modifications including: role of diet, low calorie and carbohydrate restrictions, limiting fast food and avoiding high sugary beverages.   Also discussed, the role of exercise with an ultimate goal of at least 250-300 minutes a week for weight loss and weight maintenance.  Follow-up in 4 weeks or sooner, as needed.

## 2024-06-17 ENCOUNTER — OFFICE VISIT (OUTPATIENT)
Dept: PAIN MEDICINE | Facility: CLINIC | Age: 64
End: 2024-06-17
Payer: COMMERCIAL

## 2024-06-17 ENCOUNTER — APPOINTMENT (OUTPATIENT)
Dept: PAIN MEDICINE | Facility: CLINIC | Age: 64
End: 2024-06-17
Payer: COMMERCIAL

## 2024-06-17 VITALS
WEIGHT: 223.2 LBS | HEIGHT: 73 IN | HEART RATE: 67 BPM | SYSTOLIC BLOOD PRESSURE: 113 MMHG | OXYGEN SATURATION: 98 % | DIASTOLIC BLOOD PRESSURE: 71 MMHG | BODY MASS INDEX: 29.58 KG/M2

## 2024-06-17 DIAGNOSIS — E66.09 CLASS 1 OBESITY DUE TO EXCESS CALORIES WITHOUT SERIOUS COMORBIDITY WITH BODY MASS INDEX (BMI) OF 30.0 TO 30.9 IN ADULT: ICD-10-CM

## 2024-06-17 DIAGNOSIS — Z71.82 EXERCISE COUNSELING: ICD-10-CM

## 2024-06-17 DIAGNOSIS — R63.2 POLYPHAGIA: ICD-10-CM

## 2024-06-17 DIAGNOSIS — E11.9 TYPE 2 DIABETES MELLITUS WITHOUT COMPLICATION, WITHOUT LONG-TERM CURRENT USE OF INSULIN (MULTI): Primary | ICD-10-CM

## 2024-06-17 DIAGNOSIS — Z71.3 DIETARY COUNSELING: ICD-10-CM

## 2024-06-17 PROCEDURE — 99214 OFFICE O/P EST MOD 30 MIN: CPT | Performed by: NURSE PRACTITIONER

## 2024-06-17 PROCEDURE — 3074F SYST BP LT 130 MM HG: CPT | Performed by: NURSE PRACTITIONER

## 2024-06-17 PROCEDURE — 99401 PREV MED CNSL INDIV APPRX 15: CPT | Performed by: NURSE PRACTITIONER

## 2024-06-17 PROCEDURE — 3078F DIAST BP <80 MM HG: CPT | Performed by: NURSE PRACTITIONER

## 2024-06-17 PROCEDURE — 1036F TOBACCO NON-USER: CPT | Performed by: NURSE PRACTITIONER

## 2024-06-17 PROCEDURE — 3008F BODY MASS INDEX DOCD: CPT | Performed by: NURSE PRACTITIONER

## 2024-06-17 RX ORDER — TIRZEPATIDE 5 MG/.5ML
5 INJECTION, SOLUTION SUBCUTANEOUS
Qty: 2 ML | Refills: 1 | Status: SHIPPED | OUTPATIENT
Start: 2024-06-17

## 2024-06-17 RX ORDER — TIRZEPATIDE 5 MG/.5ML
5 INJECTION, SOLUTION SUBCUTANEOUS
Qty: 2 ML | Refills: 1 | Status: SHIPPED | OUTPATIENT
Start: 2024-06-17 | End: 2024-06-17

## 2024-06-21 ENCOUNTER — OFFICE VISIT (OUTPATIENT)
Dept: PAIN MEDICINE | Facility: CLINIC | Age: 64
End: 2024-06-21
Payer: COMMERCIAL

## 2024-06-21 VITALS
HEIGHT: 73 IN | DIASTOLIC BLOOD PRESSURE: 77 MMHG | SYSTOLIC BLOOD PRESSURE: 115 MMHG | WEIGHT: 223 LBS | HEART RATE: 61 BPM | BODY MASS INDEX: 29.55 KG/M2 | OXYGEN SATURATION: 98 %

## 2024-06-21 DIAGNOSIS — G57.11 MERALGIA PARESTHETICA OF RIGHT SIDE: ICD-10-CM

## 2024-06-21 DIAGNOSIS — G89.29 CHRONIC LEFT SHOULDER PAIN: Primary | ICD-10-CM

## 2024-06-21 DIAGNOSIS — M54.12 CERVICAL RADICULITIS: ICD-10-CM

## 2024-06-21 DIAGNOSIS — M25.512 CHRONIC LEFT SHOULDER PAIN: Primary | ICD-10-CM

## 2024-06-21 DIAGNOSIS — M96.1 LUMBAR POSTLAMINECTOMY SYNDROME: ICD-10-CM

## 2024-06-21 DIAGNOSIS — M19.012 PRIMARY OSTEOARTHRITIS OF LEFT SHOULDER: ICD-10-CM

## 2024-06-21 DIAGNOSIS — M13.812 OTHER SPECIFIED ARTHRITIS, LEFT SHOULDER: ICD-10-CM

## 2024-06-21 PROCEDURE — 3008F BODY MASS INDEX DOCD: CPT | Performed by: ANESTHESIOLOGY

## 2024-06-21 PROCEDURE — 3074F SYST BP LT 130 MM HG: CPT | Performed by: ANESTHESIOLOGY

## 2024-06-21 PROCEDURE — 1036F TOBACCO NON-USER: CPT | Performed by: ANESTHESIOLOGY

## 2024-06-21 PROCEDURE — 3078F DIAST BP <80 MM HG: CPT | Performed by: ANESTHESIOLOGY

## 2024-06-21 PROCEDURE — 99214 OFFICE O/P EST MOD 30 MIN: CPT | Performed by: ANESTHESIOLOGY

## 2024-06-21 ASSESSMENT — ENCOUNTER SYMPTOMS
CARDIOVASCULAR NEGATIVE: 1
EYES NEGATIVE: 1
BACK PAIN: 1
ENDOCRINE NEGATIVE: 1
CONSTITUTIONAL NEGATIVE: 1
GASTROINTESTINAL NEGATIVE: 1
NUMBNESS: 1
PSYCHIATRIC NEGATIVE: 1
RESPIRATORY NEGATIVE: 1
MYALGIAS: 1
WEAKNESS: 1
NECK STIFFNESS: 1
HEMATOLOGIC/LYMPHATIC NEGATIVE: 1

## 2024-06-21 ASSESSMENT — PAIN SCALES - GENERAL: PAINLEVEL: 3

## 2024-06-21 NOTE — PROGRESS NOTES
PAIN MANAGEMENT FOLLOW-UP OFFICE NOTE    Date of Service: 6/21/2024    SUBJECTIVE    CHIEF COMPLAINT: LBP    HISTORY OF PRESENT ILLNESS    John Delatorre is a 63 y.o. male with PMH  obesity, HTN, CHON non-adherent to CPAP, pDM2 who presents for follow-up LB pain.    Since his last visit, pt denies return of radicular pain. Notes residual midline LBP that is worse with activity that improves with rest, heat, ibuprofen. Prolonged sitting is also painful. 5/10 at worst, 0/10 at best. R thigh numbness resolved. Neck pain controlled    Pt denies new-onset numbness, weakness, bowel/bladder incontinence.  Pt denies recent infection, allergy to Latex/iodine/contrast. Patient is currently taking the following blood thinner(s): N/A    Procedure Log:  -GIA 2/8/24: 100% ongoing relief of radicular pain.  -R LFCNB 11/30/23: 100% relief (and numbness) for 3 days    REVIEW OF SYSTEMS  Review of Systems   Constitutional: Negative.    HENT: Negative.     Eyes: Negative.    Respiratory: Negative.     Cardiovascular: Negative.    Gastrointestinal: Negative.    Endocrine: Negative.    Musculoskeletal:  Positive for back pain, myalgias and neck stiffness.   Skin: Negative.    Neurological:  Positive for weakness and numbness.   Hematological: Negative.    Psychiatric/Behavioral: Negative.         PAST MEDICAL HISTORY  Past Medical History:   Diagnosis Date    Personal history of other endocrine, nutritional and metabolic disease 03/01/2019    History of diabetes mellitus    Personal history of other endocrine, nutritional and metabolic disease 03/01/2019    History of hyperlipidemia     Past Surgical History:   Procedure Laterality Date    ABDOMINAL SURGERY      BACK SURGERY  2000    HERNIA REPAIR      OTHER SURGICAL HISTORY  03/01/2019    Back surgery    SPINE SURGERY      TONSILLECTOMY      US GUIDED PERCUTANEOUS PLACEMENT  11/30/2023    US GUIDED PERCUTANEOUS PLACEMENT 11/30/2023 [facility]     Family History   Problem Relation Name Age  "of Onset    Hyperlipidemia Mother Radha     Hypertension Mother Radha     Stroke Mother Radha         CVA's    Skin cancer Mother Radha     Arthritis Mother Radha     Cancer Mother Radha     Diabetes Father David     Heart disease Father David     Leukemia Father David         Acute myeloid leukemia    Arthritis Father David     Hypertension Sister Kathe     Hyperlipidemia Sister Kathe     Diabetes Sister Kathe     Obesity Sister Kathe        CURRENT MEDICATIONS  Current Outpatient Medications   Medication Sig Dispense Refill    lidocaine (Xylocaine) 5 % ointment Apply topically every 12 hours.      Lidoderm 5 % patch Place 1 patch on the skin once daily.      simvastatin (Zocor) 10 mg tablet Take 1 tablet (10 mg) by mouth once daily in the evening. 90 tablet 1    tirzepatide (Mounjaro) 5 mg/0.5 mL pen injector Inject 5 mg under the skin every 7 days. 2 mL 1    hydrOXYzine HCL (Atarax) 10 mg tablet Take 1 tablet (10 mg) by mouth once daily as needed for anxiety. 30 tablet 1    Ozempic 0.25 mg or 0.5 mg (2 mg/3 mL) pen injector INJECT 0.5 MG UNDER THE SKIN EVERY 7 DAYS FOR 28 DAYS 3 mL 0     No current facility-administered medications for this visit.       ALLERGIES AND DRUG REACTIONS  Allergies   Allergen Reactions    Atorvastatin Other     Abdominal pain     Gemfibrozil Unknown    Niacin Other     Rectal bleeding     Rosuvastatin Other     Abdominal pain           OBJECTIVE  Visit Vitals  /77   Pulse 61   Ht 1.854 m (6' 1\")   Wt 101 kg (223 lb)   SpO2 98%   BMI 29.42 kg/m²   Smoking Status Never   BSA 2.28 m²       Last Recorded Pain Score (if available):                Physical Exam  General: Sitting in chair, NAD  Head: NCAT  Eyes: Sclera/conjunctiva clear, EOMI, PERRL  Nose/mouth: MMM  CV: Good distal pulses  Lungs: Good/equal chest excursion  Abdomen: Soft, ND  Ext: No cyanosis/edema  MSK: L-spine alignment: WNL, BL paraspinal m NTTP, L-spine ROM: full   C-spine alignment: anterior " tilt, BL paraspinal m TTP, pain on ext, rotation, lateral flexion; neg Spurling, Lhermitte  LEFT shoulder: no swelling, erythema, laxity. +GHJ TTP. Pain on full abduction, passive/active ROM full. Neg Galvan/empty can    Neuro: Dermatome sensation to light touch  LEFT C5: WNL    RIGHT C5: WNL      LEFT C6: WNL       RIGHT C6: WNL      LEFT C7: WNL       RIGHT C7: WNL      LEFT C8: WNL       RIGHT C8: WNL      LEFT T1: WNL       RIGHT T1: WNL    LEFT L1 (lower pelvis/upper thigh): WNL    RIGHT L1: WNL      LEFT L2 (upper thigh): WNL       RIGHT: L2:WNL      LEFT L3 (medial knee): WNL       RIGHT L3: WNL      LEFT L4 (superior medial malleolus): WNL       RIGHT L4: WNL      LEFT L5 (dorsal foot): WNL       RIGHT L5: WNL      LEFT S1 (lateral foot): WNL     RIGHT S1: WNL      LEFT S2 (popliteal fossa): WNL    RIGHT S2: WNL        Motor strength  LEFT C5 (elbow flexion): 5/5   RIGHT C5: 5/5  LEFT C6 (wrist extension): 5/5     RIGHT C6: 5/5  LEFT C7 (elbow extension): 5/5     RIGHT C7: 5/5  LEFT C8 (finger abduction): 5/5     RIGHT C8: 5/5  LEFT T1 (hand ): 5/5     RIGHT T1: 5/5    LEFT L2 (hip flexion): 5/5   RIGHT L2: 5/5  LEFT L3 (knee extension): 5/5     RIGHT L3: 5/5  LEFT L4 (dorsiflexion): 5/5     RIGHT L4: 5/5  LEFT L5 (EHL extension): 5/5     RIGHT L5: 5/5  LEFT S1 (plantarflexion): 5/5     RIGHT S1: 5/5  LEFT S2 (knee flexion): 5/5      RIGHT S2: 5/5    Special testing  Gill: neg BL  DTR unremarkable      Psych: affect nl  Skin: no rash/lesions      REVIEW OF LABORATORY DATA  I have reviewed the following lab results:  WBC   Date Value Ref Range Status   02/20/2023 6.8 4.4 - 11.3 x10E9/L Final     RBC   Date Value Ref Range Status   02/20/2023 5.42 4.50 - 5.90 x10E12/L Final     Hemoglobin   Date Value Ref Range Status   02/20/2023 15.6 13.5 - 17.5 g/dL Final     Hematocrit   Date Value Ref Range Status   02/20/2023 48.8 41.0 - 52.0 % Final     MCV   Date Value Ref Range Status   02/20/2023 90 80 - 100  "fL Final     MCH   Date Value Ref Range Status   01/16/2019 29.5 26 - 34 PG Final     MCHC   Date Value Ref Range Status   02/20/2023 32.0 32.0 - 36.0 g/dL Final     RDW   Date Value Ref Range Status   02/20/2023 13.3 11.5 - 14.5 % Final     Platelets   Date Value Ref Range Status   02/20/2023 320 150 - 450 x10E9/L Final     MPV   Date Value Ref Range Status   01/16/2019 9.5 7.0 - 12.6 CU Final     Sodium   Date Value Ref Range Status   12/05/2023 138 136 - 145 mmol/L Final     Potassium   Date Value Ref Range Status   12/05/2023 4.7 3.5 - 5.3 mmol/L Final     Bicarbonate   Date Value Ref Range Status   12/05/2023 26 21 - 32 mmol/L Final     Urea Nitrogen   Date Value Ref Range Status   12/05/2023 17 6 - 23 mg/dL Final     Calcium   Date Value Ref Range Status   12/05/2023 9.5 8.6 - 10.3 mg/dL Final     No results found for: \"PROTIME\", \"PTT\", \"INR\", \"FIBRINOGEN\"      REVIEW OF RADIOLOGY   I have reviewed the following:  Radiology Studies           MRI c-spine 11/11/19:  Alignment: There is straightening of the cervical spine.     Vertebrae/Intervertebral Discs: The vertebral bodies demonstrate  expected height. There is hypointense T1 and T2 signal in the dens  consistent with sclerosis due to chronic degenerative changes.  Otherwise, marrow signal is within normal limits. There is diffuse  desiccation of the intervertebral discs. Intervertebral disc heights  are maintained.     Cord: Spinal cord is normal in signal and caliber.     C1-C2: There is no central canal stenosis.     C2-C3: There is a disc osteophyte complex which partially effaces the  ventral thecal sac and causes overall mild spinal canal stenosis.  There is mild left uncovertebral hypertrophy and facet  osteoarthropathy. There is no significant narrowing of the neural  foramina. There is no right facet osteoarthropathy.     C3-C4: There is osteophyte disc complex, partially effacing the  ventral thecal sac and causing overall moderate spinal " canal  stenosis. There are bilateral uncovertebral osteophytes, right  greater than left, and mild right facet osteoarthropathy. There is  resulting at most mild bilateral neural foraminal narrowing. There is  no significant left-sided facet osteoarthropathy.     C4-C5: There is a disc osteophyte complex including a central disc  protrusion which causes marked spinal canal stenosis and indents the  ventral cord. There is mild bilateral neural foraminal narrowing due  to uncovertebral hypertrophy and facet osteoarthropathy.     C5-C6: There is moderate spinal canal stenosis due to disc osteophyte  complex. There is mild-to-moderate bilateral neural foraminal  narrowing due to uncovertebral hypertrophy and facet osteoarthropathy.     C6-C7: There is mild spinal canal stenosis due to disc osteophyte  complex. There is mild bilateral neural foraminal narrowing due to  uncovertebral hypertrophy. There is no significant facet  osteoarthropathy.     C7-T1: There is no posterior disc contour abnormality. There is no  spinal canal or neural foraminal stenosis. There is mild left facet  osteoarthropathy. There is no right facet osteoarthropathy.     There is a left paracentral disc protrusion at T2-T3 which partially  effaces the ventral thecal sac. There is no spinal canal stenosis.  The disc extends into the left neural foramen but causes no  significant neural foraminal narrowing. There is no narrowing of the  right neural foramen. There is mild left facet osteoarthropathy.     The prevertebral and posterior paraspinal soft tissues are  unremarkable.     IMPRESSION:  1. Multilevel degenerative changes in the cervical spine, most  pronounced at the level C4-C5, where there is marked spinal canal  stenosis. There is moderate spinal canal stenosis at C3-C4 and C5-C6  and mild spinal canal stenosis at C2-C3 and C6-C7. There is variable  degree of neural foraminal narrowing at multiple levels as detailed  above.            ASSESSMENT & PLAN  John Delatorre is a 63 y.o. old male with PMH obesity, HTN, CHON non-adherent to CPAP, pDM2 who presents for F/U LB pain       1) Lumbar PLS after L3-S1 lami 2000  -Relieved his BLE pain, but worsening LBP radiating to L groin  -Refractive to yrs of conservative tx including Tylenol, NSAIDs, tizanidine, MDP, >6 w PT   -MRI L-spine 1/18/24: stable L3-S1 lami, L2-3 disc bulge/LFH contributing to mild canal/BL NF stenosis, left mod-severe vs mild R NFS at L3-4 with disc osteophyte displacing nerve, mod BL NFS at L4-5, mod-sever eL>R NFS at L5-S1  -GIA 2/8/24: 100% ongoing relief radicular sx  -Residual axial LBP- consider MBB vs Intracept pending patient to bring in CD for image review  -Consider SCS if refractive     2) R meralgia paresthetica, improved  -Objective numbness in distribution of R LFCN likely 2/2 obesity  -R LFNCB 11/30: 100% relief (and numbness) for 3 days  -resolved objective R thigh numbness  -Briefly discussed DRG, but pt would like to revisit at a later date    3) Neck pain  -Neck pain since 2019 radiating to BL shoulders associated with BL thumb numbness, left hand weakness without obj deficit most c/w radiculitis  -Refractive to >4 y conservative tx including rest, MDP, Tylenol, ibuprofen, >6 w PT  -MRI c-spine to eval neuraxial pathology  -Consider TRISHA pending result    4) L shoulder pain  -Likely 2/2 OA  -Refractive to Tylenol, NSAIDs  -XR BL shoulders  -Referral to PT  -Schedule L GHJ/ACJ CSI & SSNB under US        Discussed procedure risks/benefits in detail with patient. Pt meets medical necessity for procedure due to failure of conservative measures. Reviewed procedural risks including bleeding, infection, nerve damage, paralysis. Also reviewed mitigating factors such as screening for infection/blood thinner use, sterile precautions, and image-guidance when applicable. All questions answered. Pt/guardian expressed understanding and choose to proceed            Gail  MD Claudine  Anesthesiologist & Interventional Pain Physician   [facility]  O: [phone]  F: [phone]  8:12 AM  06/21/24

## 2024-06-24 ENCOUNTER — HOSPITAL ENCOUNTER (OUTPATIENT)
Dept: RADIOLOGY | Facility: CLINIC | Age: 64
Discharge: HOME | End: 2024-06-24
Payer: COMMERCIAL

## 2024-06-24 PROCEDURE — 73030 X-RAY EXAM OF SHOULDER: CPT | Mod: BILATERAL PROCEDURE | Performed by: RADIOLOGY

## 2024-06-24 PROCEDURE — 73030 X-RAY EXAM OF SHOULDER: CPT | Mod: 50

## 2024-06-25 ENCOUNTER — TELEPHONE (OUTPATIENT)
Dept: PAIN MEDICINE | Facility: CLINIC | Age: 64
End: 2024-06-25
Payer: COMMERCIAL

## 2024-06-25 DIAGNOSIS — E11.9 TYPE 2 DIABETES MELLITUS WITHOUT COMPLICATION, WITHOUT LONG-TERM CURRENT USE OF INSULIN (MULTI): ICD-10-CM

## 2024-06-25 RX ORDER — TIRZEPATIDE 5 MG/.5ML
5 INJECTION, SOLUTION SUBCUTANEOUS
Qty: 2 ML | Refills: 1 | Status: SHIPPED | OUTPATIENT
Start: 2024-06-25

## 2024-07-03 ENCOUNTER — PATIENT MESSAGE (OUTPATIENT)
Dept: PRIMARY CARE | Facility: CLINIC | Age: 64
End: 2024-07-03
Payer: COMMERCIAL

## 2024-07-03 DIAGNOSIS — R09.81 CHRONIC NASAL CONGESTION: Primary | ICD-10-CM

## 2024-07-03 RX ORDER — METHYLPREDNISOLONE 4 MG/1
TABLET ORAL
Qty: 21 TABLET | Refills: 1 | Status: SHIPPED | OUTPATIENT
Start: 2024-07-03

## 2024-07-08 ENCOUNTER — TELEPHONE (OUTPATIENT)
Dept: PAIN MEDICINE | Facility: CLINIC | Age: 64
End: 2024-07-08
Payer: COMMERCIAL

## 2024-07-29 ENCOUNTER — OFFICE VISIT (OUTPATIENT)
Dept: PAIN MEDICINE | Facility: CLINIC | Age: 64
End: 2024-07-29
Payer: COMMERCIAL

## 2024-07-29 VITALS
WEIGHT: 217 LBS | DIASTOLIC BLOOD PRESSURE: 80 MMHG | BODY MASS INDEX: 28.76 KG/M2 | SYSTOLIC BLOOD PRESSURE: 118 MMHG | HEART RATE: 67 BPM | OXYGEN SATURATION: 99 % | HEIGHT: 73 IN

## 2024-07-29 DIAGNOSIS — Z71.3 DIETARY COUNSELING: ICD-10-CM

## 2024-07-29 DIAGNOSIS — E11.9 TYPE 2 DIABETES MELLITUS WITHOUT COMPLICATION, WITHOUT LONG-TERM CURRENT USE OF INSULIN (MULTI): Primary | ICD-10-CM

## 2024-07-29 DIAGNOSIS — E66.3 OVERWEIGHT (BMI 25.0-29.9): ICD-10-CM

## 2024-07-29 DIAGNOSIS — Z71.82 EXERCISE COUNSELING: ICD-10-CM

## 2024-07-29 DIAGNOSIS — R63.2 POLYPHAGIA: ICD-10-CM

## 2024-07-29 PROCEDURE — 3074F SYST BP LT 130 MM HG: CPT | Performed by: NURSE PRACTITIONER

## 2024-07-29 PROCEDURE — 99401 PREV MED CNSL INDIV APPRX 15: CPT | Performed by: NURSE PRACTITIONER

## 2024-07-29 PROCEDURE — 99214 OFFICE O/P EST MOD 30 MIN: CPT | Performed by: NURSE PRACTITIONER

## 2024-07-29 PROCEDURE — 3079F DIAST BP 80-89 MM HG: CPT | Performed by: NURSE PRACTITIONER

## 2024-07-29 PROCEDURE — 1036F TOBACCO NON-USER: CPT | Performed by: NURSE PRACTITIONER

## 2024-07-29 PROCEDURE — 3008F BODY MASS INDEX DOCD: CPT | Performed by: NURSE PRACTITIONER

## 2024-07-29 RX ORDER — TIRZEPATIDE 5 MG/.5ML
5 INJECTION, SOLUTION SUBCUTANEOUS
Qty: 2 ML | Refills: 1 | Status: SHIPPED | OUTPATIENT
Start: 2024-07-29

## 2024-07-29 ASSESSMENT — PAIN SCALES - GENERAL: PAINLEVEL: 2

## 2024-07-29 NOTE — PROGRESS NOTES
"Subjective   Patient ID: John Delatorre \"Denilson\" is a 63 y.o. male who presents for Weight Management.     HPI  62 YO Male with Obesity presents for a Weight Management Follow-up. Denilson is currently on Tirzepatide. He denies N/V/D/C or abdominal pain. He he would like to stay at the current dose as he just picked up a new prescription.  We will [place] back in 4 weeks.     Weight Hx:  Initial Weight: 249.6  Last Visit Weight: 223.2  Current Weight: 217  Total Loss: 32.6lbs       Diet Recall:  Breakfast: Eggwich sandwich and turkey sandwich  Lunch: complete homestyle, high protein  Dinner: high protein shake, walleye fillet and green beans  Snack(s):  none     Current Exercise Regimen:      Review of Systems. Unless noted in the HPI all other systems have been reviewed and are negative for complaint.    Objective   Physical Exam  General- No acute distress, well appearing and well nourished. Obese  Eyes Conjunctiva and lids: No erythema, swelling or discharge  Neck - Supple, no cervical lymphadenopathy.   Pulmonary - Respiratory effort: Normal respiration.   Cardiovascular - Normal rate and rhythm.  Examination of extremities for edema and/or varicosities: No peripheral edema  Abdomen: Soft, Non-tender, non-distended, no abdominal masses.   Musculoskeletal - Range of motion: normal  Skin - Skin and subcutaneous tissue: Normal without rashes or lesions.  Neurologic - Reflexes: Normal. Coordination: Normal gait   Psychiatric - Orientation to person, place, and time: Normal. Mood and affect: Normal    Assessment/Plan   Assessment & Plan  Type 2 diabetes mellitus without complication, without long-term current use of insulin (Multi)    Orders:    tirzepatide (Mounjaro) 5 mg/0.5 mL pen injector; Inject 5 mg under the skin every 7 days.    Polyphagia         Dietary counseling         Exercise counseling         Overweight (BMI 25.0-29.9)         TREATMENT PLAN:  62 YO Male with Obesity here for Weight Management.  Current BMI: " 29  Total Loss: 32.6lbs  Currently on 5mg weekly of Tirzepatide; denies adverse effects.  Will continue with current dose of Tirzepatide.   Patient denies personal or family hx of MTC, MENII or Pancreatitis.   Risks and benefits discussed.   > 50% of time spent counseling on the importance of following recommended dietary modifications including: role of diet, low calorie and carbohydrate restrictions, limiting fast food and avoiding high sugary beverages.   Also discussed, the role of exercise with an ultimate goal of at least 250-300 minutes a week for weight loss and weight maintenance.  Follow-up in 4 weeks or sooner, as needed.

## 2024-07-29 NOTE — ASSESSMENT & PLAN NOTE
Orders:    tirzepatide (Mounjaro) 5 mg/0.5 mL pen injector; Inject 5 mg under the skin every 7 days.

## 2024-07-29 NOTE — H&P (VIEW-ONLY)
"Subjective   Patient ID: John Delatorre \"Denilson\" is a 63 y.o. male who presents for Weight Management.     HPI  62 YO Male with Obesity presents for a Weight Management Follow-up. Denilson is currently on Tirzepatide. He denies N/V/D/C or abdominal pain. He he would like to stay at the current dose as he just picked up a new prescription.  We will [place] back in 4 weeks.     Weight Hx:  Initial Weight: 249.6  Last Visit Weight: 223.2  Current Weight: 217  Total Loss: 32.6lbs       Diet Recall:  Breakfast: Eggwich sandwich and turkey sandwich  Lunch: complete homestyle, high protein  Dinner: high protein shake, walleye fillet and green beans  Snack(s):  none     Current Exercise Regimen:      Review of Systems. Unless noted in the HPI all other systems have been reviewed and are negative for complaint.    Objective   Physical Exam  General- No acute distress, well appearing and well nourished. Obese  Eyes Conjunctiva and lids: No erythema, swelling or discharge  Neck - Supple, no cervical lymphadenopathy.   Pulmonary - Respiratory effort: Normal respiration.   Cardiovascular - Normal rate and rhythm.  Examination of extremities for edema and/or varicosities: No peripheral edema  Abdomen: Soft, Non-tender, non-distended, no abdominal masses.   Musculoskeletal - Range of motion: normal  Skin - Skin and subcutaneous tissue: Normal without rashes or lesions.  Neurologic - Reflexes: Normal. Coordination: Normal gait   Psychiatric - Orientation to person, place, and time: Normal. Mood and affect: Normal    Assessment/Plan   Assessment & Plan  Type 2 diabetes mellitus without complication, without long-term current use of insulin (Multi)    Orders:    tirzepatide (Mounjaro) 5 mg/0.5 mL pen injector; Inject 5 mg under the skin every 7 days.    Polyphagia         Dietary counseling         Exercise counseling         Overweight (BMI 25.0-29.9)         TREATMENT PLAN:  62 YO Male with Obesity here for Weight Management.  Current BMI: " 29  Total Loss: 32.6lbs  Currently on 5mg weekly of Tirzepatide; denies adverse effects.  Will continue with current dose of Tirzepatide.   Patient denies personal or family hx of MTC, MENII or Pancreatitis.   Risks and benefits discussed.   > 50% of time spent counseling on the importance of following recommended dietary modifications including: role of diet, low calorie and carbohydrate restrictions, limiting fast food and avoiding high sugary beverages.   Also discussed, the role of exercise with an ultimate goal of at least 250-300 minutes a week for weight loss and weight maintenance.  Follow-up in 4 weeks or sooner, as needed.

## 2024-08-08 ENCOUNTER — HOSPITAL ENCOUNTER (OUTPATIENT)
Dept: GASTROENTEROLOGY | Facility: HOSPITAL | Age: 64
Discharge: HOME | End: 2024-08-08
Payer: COMMERCIAL

## 2024-08-08 VITALS
WEIGHT: 217 LBS | DIASTOLIC BLOOD PRESSURE: 93 MMHG | SYSTOLIC BLOOD PRESSURE: 126 MMHG | HEART RATE: 79 BPM | BODY MASS INDEX: 28.76 KG/M2 | HEIGHT: 73 IN | TEMPERATURE: 96.8 F | OXYGEN SATURATION: 99 % | RESPIRATION RATE: 17 BRPM

## 2024-08-08 DIAGNOSIS — M25.511 CHRONIC RIGHT SHOULDER PAIN: ICD-10-CM

## 2024-08-08 DIAGNOSIS — G89.29 CHRONIC RIGHT SHOULDER PAIN: ICD-10-CM

## 2024-08-08 DIAGNOSIS — G89.29 CHRONIC LEFT SHOULDER PAIN: ICD-10-CM

## 2024-08-08 DIAGNOSIS — M13.812 OTHER SPECIFIED ARTHRITIS, LEFT SHOULDER: ICD-10-CM

## 2024-08-08 DIAGNOSIS — M25.512 CHRONIC LEFT SHOULDER PAIN: ICD-10-CM

## 2024-08-08 DIAGNOSIS — M19.012 PRIMARY OSTEOARTHRITIS OF LEFT SHOULDER: ICD-10-CM

## 2024-08-08 DIAGNOSIS — M19.011 PRIMARY OSTEOARTHRITIS OF RIGHT SHOULDER: Primary | ICD-10-CM

## 2024-08-08 DIAGNOSIS — M13.811 OTHER SPECIFIED ARTHRITIS, RIGHT SHOULDER: ICD-10-CM

## 2024-08-08 PROCEDURE — 76942 ECHO GUIDE FOR BIOPSY: CPT | Performed by: ANESTHESIOLOGY

## 2024-08-08 PROCEDURE — 2500000005 HC RX 250 GENERAL PHARMACY W/O HCPCS: Performed by: ANESTHESIOLOGY

## 2024-08-08 PROCEDURE — 64418 NJX AA&/STRD SPRSCAP NRV: CPT | Mod: 50 | Performed by: ANESTHESIOLOGY

## 2024-08-08 PROCEDURE — 20606 DRAIN/INJ JOINT/BURSA W/US: CPT | Mod: 50 | Performed by: ANESTHESIOLOGY

## 2024-08-08 PROCEDURE — 2500000004 HC RX 250 GENERAL PHARMACY W/ HCPCS (ALT 636 FOR OP/ED): Performed by: ANESTHESIOLOGY

## 2024-08-08 PROCEDURE — 20611 DRAIN/INJ JOINT/BURSA W/US: CPT | Mod: 50 | Performed by: ANESTHESIOLOGY

## 2024-08-08 PROCEDURE — 20606 DRAIN/INJ JOINT/BURSA W/US: CPT | Performed by: ANESTHESIOLOGY

## 2024-08-08 PROCEDURE — 64418 NJX AA&/STRD SPRSCAP NRV: CPT | Performed by: ANESTHESIOLOGY

## 2024-08-08 PROCEDURE — 20611 DRAIN/INJ JOINT/BURSA W/US: CPT | Performed by: ANESTHESIOLOGY

## 2024-08-08 RX ORDER — LIDOCAINE HYDROCHLORIDE 10 MG/ML
INJECTION, SOLUTION EPIDURAL; INFILTRATION; INTRACAUDAL; PERINEURAL AS NEEDED
Status: COMPLETED | OUTPATIENT
Start: 2024-08-08 | End: 2024-08-08

## 2024-08-08 RX ORDER — METHYLPREDNISOLONE ACETATE 80 MG/ML
INJECTION, SUSPENSION INTRA-ARTICULAR; INTRALESIONAL; INTRAMUSCULAR; SOFT TISSUE AS NEEDED
Status: COMPLETED | OUTPATIENT
Start: 2024-08-08 | End: 2024-08-08

## 2024-08-08 RX ORDER — BUPIVACAINE HYDROCHLORIDE 5 MG/ML
INJECTION, SOLUTION PERINEURAL AS NEEDED
Status: COMPLETED | OUTPATIENT
Start: 2024-08-08 | End: 2024-08-08

## 2024-08-08 ASSESSMENT — PAIN SCALES - GENERAL
PAINLEVEL_OUTOF10: 1
PAINLEVEL_OUTOF10: 0 - NO PAIN

## 2024-08-08 ASSESSMENT — PAIN - FUNCTIONAL ASSESSMENT
PAIN_FUNCTIONAL_ASSESSMENT: 0-10
PAIN_FUNCTIONAL_ASSESSMENT: 0-10

## 2024-08-08 ASSESSMENT — COLUMBIA-SUICIDE SEVERITY RATING SCALE - C-SSRS
2. HAVE YOU ACTUALLY HAD ANY THOUGHTS OF KILLING YOURSELF?: NO
1. IN THE PAST MONTH, HAVE YOU WISHED YOU WERE DEAD OR WISHED YOU COULD GO TO SLEEP AND NOT WAKE UP?: NO
6. HAVE YOU EVER DONE ANYTHING, STARTED TO DO ANYTHING, OR PREPARED TO DO ANYTHING TO END YOUR LIFE?: NO

## 2024-08-08 ASSESSMENT — PAIN DESCRIPTION - DESCRIPTORS: DESCRIPTORS: DULL

## 2024-08-08 NOTE — DISCHARGE INSTRUCTIONS
DISCHARGE INSTRUCTIONS FOR INJECTIONS     You underwent bilateral shoulder injections today    Aftermost injections, it is recommended that you relax and limit your activity for the remainder of the day unless you have been told otherwise by your pain physician.  You should not drive a car, operate machinery, or make important legal decisions unless otherwise directed by your pain physician.  You may resume your normal activity, including exercise, tomorrow.      Keep a written pain diary of how much pain relief you experienced following the injection procedure and the length of time of pain relief you experienced pain relief. Following diagnostic injections like medial branch nerve blocks, sacroiliac joint blocks, stellate ganglion injections and other blocks, it is very important you record the specific amount of pain relief you experienced immediately after the injectionand how long it lasted. Your doctor will ask you for this information at your follow up visit.     For all injections, please keep the injection site dry and inspect the site for a couple of days. You may remove the Band-Aid the day of the injection at any time.     Some discomfort, bruising or slight swelling may occur at the injection site. This is not abnormal if it occurs.  If needed you may:    -Take over the counter medication such as Tylenol or Motrin.   -Apply an ice pack for 30 minutes, 2 to 3 times a day for the first 24 hours.     You may shower today; no soaking baths, hot tubs, whirlpools or swimming pools for two days.      If you are given steroids in your injection, it may take 3-5 days for the steroid medication to take effect. You may notice a worsening of your symptoms for 1-2 days after the injection. This is not abnormal.  You may use acetaminophen, ibuprofen, or prescription medication that your doctor may have prescribed for you if you need to do so.     A few common side effects of steroids include facial flushing,  sweating, restlessness, irritability,difficulty sleeping, increase in blood sugar, and increased blood pressure. If you have diabetes, please monitor your blood sugar at least once a day for at least 5 days. If you have poorly controlled high blood pressure, monitoryour blood pressure for at least 2 days and contact your primary care physician if these numbers are unusually high for you.      If you take aspirin or non-steroidal anti-inflammatory drugs (examples are Motrin, Advil, ibuprofen, Naprosyn, Voltaren, Relafen, etc.) you may restart these this evening, but stop taking it 3 days before your next appointment, unless instructed otherwiseby your physician.      You do not need to discontinue non-aspirin-containing pain medications prior to an injection (examples: Celebrex, tramadol, hydrocodone and acetaminophen).      If you take a blood thinning medication (Coumadin, Lovenox, Fragmin,Ticlid, Plavix, Pradaxa, etc.), please discuss this with your primary care physician/cardiologist and your pain physician. These medications MUST be discontinued before you can have an injection safely, without the risk of uncontrolled bleeding. If these medications are not discontinued for an appropriate period of time, you will not be able to receivean injection.      If you are taking Coumadin, please have your INR checked the morning of your procedure and bringthe result to your appointment unless otherwise instructed. If your INR is over 1.2, your injection may need to be rescheduled to avoid uncontrolled bleeding from the needle placement.     Call [facility] at [phone] between 8am-4pm Monday - Friday if you are experiencing the following:    If you received an epidural or spinal injection:    -Headache that doesnot go away with medicine, is worse when sitting or standing up, and is greatly relieved upon lying down.   -Severe pain worse than or different than your baseline pain.   -Chills or fever  (101º F or greater).   -Drainage or signs of infection at the injection site     Go directly to the Emergency Department if you are experiencing the following and received an epidural or spinal injection:   -Abrupt weakness or progressive weakness in your legs that starts after you leave the clinic.   -Abrupt severe or worsening numbness in your legs.   -Inability to urinate after the injection or loss of bowel or bladder control without the urge to defecate or urinate.     If you have a clinical question that cannot wait until your next appointment, please call [phone] between 8am-4pm Monday - Friday or send a [employer] message. We do our best to return all non-emergency messages within 24 hours, Monday - Friday. A nurse or physician will return your message.      If you need to cancel an appointment, please call the scheduling staff at [phone] during normal business hours or leave a message at least 24 hours in advance.     If you are going to be sedated for your next procedure, you MUST have responsible adult who can legally drive accompany you home. You cannot eat or drink for eight hours prior to the planned procedure if you are going to receive sedation. You may take your non-blood thinning medications with a small sip of water.

## 2024-08-08 NOTE — INTERVAL H&P NOTE
H&P reviewed. The patient was examined and he now notes pain in R shoulder as well as L that he would like to address. XR performed on both shoulders showed OA. John Delatorre is a 63 y.o. old male with PMH obesity, HTN, CHON non-adherent to CPAP, pDM2 who presents for BL GHJ/ACJ CSI & SSNB under US. Patient's pain stable and persistent from last visit.    No personal/family hx issues with anesthesia. Denies allergies to Latex, steroids, local anesthetics, or iodine/contrast. Denies being on blood thinners. Not diabetic.  Denies fever, chills, NS, CP, SOB, cough, N/V.    Discussed procedure risks/benefits in detail with patient. Pt meets medical necessity for procedure due to failure of conservative measures. Reviewed procedural risks including bleeding, infection, nerve damage, paralysis. Also reviewed mitigating factors such as screening for infection/blood thinner use, sterile precautions, and image-guidance when applicable. All questions answered. Pt/guardian expressed understanding and choose to proceed      Gail Chow MD  Anesthesiologist & Interventional Pain Physician   [facility]  O: [phone]  F: [phone]  1:32 PM  08/08/24

## 2024-08-26 ENCOUNTER — OFFICE VISIT (OUTPATIENT)
Dept: PAIN MEDICINE | Facility: CLINIC | Age: 64
End: 2024-08-26
Payer: COMMERCIAL

## 2024-08-26 ENCOUNTER — HOSPITAL ENCOUNTER (OUTPATIENT)
Dept: RADIOLOGY | Facility: CLINIC | Age: 64
Discharge: HOME | End: 2024-08-26
Payer: COMMERCIAL

## 2024-08-26 VITALS
RESPIRATION RATE: 18 BRPM | DIASTOLIC BLOOD PRESSURE: 77 MMHG | HEIGHT: 73 IN | WEIGHT: 217 LBS | SYSTOLIC BLOOD PRESSURE: 124 MMHG | BODY MASS INDEX: 28.76 KG/M2 | HEART RATE: 65 BPM

## 2024-08-26 DIAGNOSIS — G89.29 CHRONIC PAIN OF LEFT KNEE: Primary | ICD-10-CM

## 2024-08-26 DIAGNOSIS — G89.29 CHRONIC PAIN OF LEFT KNEE: ICD-10-CM

## 2024-08-26 DIAGNOSIS — M25.562 CHRONIC PAIN OF LEFT KNEE: ICD-10-CM

## 2024-08-26 DIAGNOSIS — M25.562 CHRONIC PAIN OF LEFT KNEE: Primary | ICD-10-CM

## 2024-08-26 DIAGNOSIS — G57.11 MERALGIA PARESTHETICA OF RIGHT SIDE: ICD-10-CM

## 2024-08-26 DIAGNOSIS — M54.2 NECK PAIN: ICD-10-CM

## 2024-08-26 DIAGNOSIS — M96.1 LUMBAR POSTLAMINECTOMY SYNDROME: ICD-10-CM

## 2024-08-26 DIAGNOSIS — M54.12 CERVICAL RADICULITIS: ICD-10-CM

## 2024-08-26 PROCEDURE — 3078F DIAST BP <80 MM HG: CPT | Performed by: ANESTHESIOLOGY

## 2024-08-26 PROCEDURE — 3074F SYST BP LT 130 MM HG: CPT | Performed by: ANESTHESIOLOGY

## 2024-08-26 PROCEDURE — 73564 X-RAY EXAM KNEE 4 OR MORE: CPT | Mod: BILATERAL PROCEDURE | Performed by: RADIOLOGY

## 2024-08-26 PROCEDURE — 1036F TOBACCO NON-USER: CPT | Performed by: ANESTHESIOLOGY

## 2024-08-26 PROCEDURE — 99214 OFFICE O/P EST MOD 30 MIN: CPT | Performed by: ANESTHESIOLOGY

## 2024-08-26 PROCEDURE — 73564 X-RAY EXAM KNEE 4 OR MORE: CPT | Mod: 50

## 2024-08-26 PROCEDURE — 3008F BODY MASS INDEX DOCD: CPT | Performed by: ANESTHESIOLOGY

## 2024-08-26 ASSESSMENT — ENCOUNTER SYMPTOMS
RESPIRATORY NEGATIVE: 1
BACK PAIN: 1
WEAKNESS: 1
PSYCHIATRIC NEGATIVE: 1
CARDIOVASCULAR NEGATIVE: 1
EYES NEGATIVE: 1
MYALGIAS: 1
GASTROINTESTINAL NEGATIVE: 1
NUMBNESS: 1
HEMATOLOGIC/LYMPHATIC NEGATIVE: 1
NECK STIFFNESS: 1
ENDOCRINE NEGATIVE: 1
CONSTITUTIONAL NEGATIVE: 1

## 2024-08-26 ASSESSMENT — PAIN SCALES - GENERAL
PAINLEVEL: 0-NO PAIN
PAINLEVEL_OUTOF10: 0 - NO PAIN

## 2024-08-26 ASSESSMENT — PAIN - FUNCTIONAL ASSESSMENT: PAIN_FUNCTIONAL_ASSESSMENT: 0-10

## 2024-08-26 ASSESSMENT — PATIENT HEALTH QUESTIONNAIRE - PHQ9
2. FEELING DOWN, DEPRESSED OR HOPELESS: NOT AT ALL
SUM OF ALL RESPONSES TO PHQ9 QUESTIONS 1 AND 2: 0
1. LITTLE INTEREST OR PLEASURE IN DOING THINGS: NOT AT ALL

## 2024-08-26 ASSESSMENT — LIFESTYLE VARIABLES: TOTAL SCORE: 0

## 2024-08-26 NOTE — PROGRESS NOTES
PAIN MANAGEMENT FOLLOW-UP OFFICE NOTE    Date of Service: 8/26/2024    SUBJECTIVE    CHIEF COMPLAINT: LBP    HISTORY OF PRESENT ILLNESS    John Delatorre is a 63 y.o. male with PMH  obesity, HTN, HCON non-adherent to CPAP, pDM2 who presents for follow-up LB pain.    Pt reviews LBP stable. Neck pain stable. BL shoulders 100% improved after injections 8/8. Notes L knee pain 4 y that is worse with stairs/lunging. Pt has tried Tylenol, NSAIDs, MDP, rest without sustained relief.     Pt denies new-onset numbness, weakness, bowel/bladder incontinence.  Pt denies recent infection, allergy to Latex/iodine/contrast. Patient is currently taking the following blood thinner(s): N/A    Procedure Log:  -BL GHJ/ACJ/SSNB 8/8/24: 100% ongoing relief  -GIA 2/8/24: 100% ongoing relief of radicular pain.  -R LFCNB 11/30/23: 100% relief (and numbness) for 3 days    REVIEW OF SYSTEMS  Review of Systems   Constitutional: Negative.    HENT: Negative.     Eyes: Negative.    Respiratory: Negative.     Cardiovascular: Negative.    Gastrointestinal: Negative.    Endocrine: Negative.    Musculoskeletal:  Positive for back pain, myalgias and neck stiffness.   Skin: Negative.    Neurological:  Positive for weakness and numbness.   Hematological: Negative.    Psychiatric/Behavioral: Negative.         PAST MEDICAL HISTORY  Past Medical History:   Diagnosis Date    Personal history of other endocrine, nutritional and metabolic disease 03/01/2019    History of diabetes mellitus    Personal history of other endocrine, nutritional and metabolic disease 03/01/2019    History of hyperlipidemia     Past Surgical History:   Procedure Laterality Date    ABDOMINAL SURGERY      BACK SURGERY  2000    HERNIA REPAIR      OTHER SURGICAL HISTORY  03/01/2019    Back surgery    SPINE SURGERY      TONSILLECTOMY      US GUIDED PERCUTANEOUS PLACEMENT  11/30/2023    US GUIDED PERCUTANEOUS PLACEMENT 11/30/2023 TRI US     Family History   Problem Relation Name Age of Onset  "   Hyperlipidemia Mother Radha     Hypertension Mother Radha     Stroke Mother Radha         CVA's    Skin cancer Mother Radha     Arthritis Mother Radha     Cancer Mother Radha     Diabetes Father David     Heart disease Father David     Leukemia Father David         Acute myeloid leukemia    Arthritis Father David     Hypertension Sister Kathe     Hyperlipidemia Sister Kathe     Diabetes Sister Kathe     Obesity Sister Kathe        CURRENT MEDICATIONS  Current Outpatient Medications   Medication Sig Dispense Refill    tirzepatide (Mounjaro) 5 mg/0.5 mL pen injector Inject 5 mg under the skin every 7 days. 2 mL 1    hydrOXYzine HCL (Atarax) 10 mg tablet Take 1 tablet (10 mg) by mouth once daily as needed for anxiety. (Patient not taking: Reported on 8/8/2024) 30 tablet 1    lidocaine (Xylocaine) 5 % ointment Apply topically every 12 hours.      Lidoderm 5 % patch Place 1 patch on the skin once daily.      methylPREDNISolone (Medrol Dospak) 4 mg tablets Take as directed on package. (Patient not taking: Reported on 8/26/2024) 21 tablet 1    simvastatin (Zocor) 10 mg tablet Take 1 tablet (10 mg) by mouth once daily in the evening. (Patient not taking: Reported on 8/26/2024) 90 tablet 1     No current facility-administered medications for this visit.       ALLERGIES AND DRUG REACTIONS  Allergies   Allergen Reactions    Atorvastatin Other     Abdominal pain     Gemfibrozil Unknown    Niacin Other     Rectal bleeding     Rosuvastatin Other     Abdominal pain           OBJECTIVE  Visit Vitals  /77   Pulse 65   Resp 18   Ht 1.854 m (6' 1\")   Wt 98.4 kg (217 lb)   BMI 28.63 kg/m²   Smoking Status Never   BSA 2.25 m²       Last Recorded Pain Score (if available):                Physical Exam  General: Sitting in chair, NAD  Head: NCAT  Eyes: Sclera/conjunctiva clear, EOMI, PERRL  Nose/mouth: MMM  CV: Good distal pulses  Lungs: Good/equal chest excursion  Abdomen: Soft, ND  Ext: No " cyanosis/edema  MSK: C-spine alignment: anterior tilt, BL paraspinal m TTP, pain on ext, rotation, lateral flexion; neg Spurling, Lhermitte  LEFT KNEE: NTTP, mild swelling, no erythema. ROM full. No laxity. Neg ant/post drawer    Neuro: Dermatome sensation to light touch  LEFT C5: WNL    RIGHT C5: WNL      LEFT C6: WNL       RIGHT C6: WNL      LEFT C7: WNL       RIGHT C7: WNL      LEFT C8: WNL       RIGHT C8: WNL      LEFT T1: WNL       RIGHT T1: WNL     Motor strength  LEFT C5 (elbow flexion): 5/5   RIGHT C5: 5/5  LEFT C6 (wrist extension): 5/5     RIGHT C6: 5/5  LEFT C7 (elbow extension): 5/5     RIGHT C7: 5/5  LEFT C8 (finger abduction): 5/5     RIGHT C8: 5/5  LEFT T1 (hand ): 5/5     RIGHT T1: 5/5    Special testing  Gill: neg BL  DTR unremarkable      Psych: affect nl  Skin: no rash/lesions      REVIEW OF LABORATORY DATA  I have reviewed the following lab results:  WBC   Date Value Ref Range Status   02/20/2023 6.8 4.4 - 11.3 x10E9/L Final     RBC   Date Value Ref Range Status   02/20/2023 5.42 4.50 - 5.90 x10E12/L Final     Hemoglobin   Date Value Ref Range Status   02/20/2023 15.6 13.5 - 17.5 g/dL Final     Hematocrit   Date Value Ref Range Status   02/20/2023 48.8 41.0 - 52.0 % Final     MCV   Date Value Ref Range Status   02/20/2023 90 80 - 100 fL Final     MCH   Date Value Ref Range Status   01/16/2019 29.5 26 - 34 PG Final     MCHC   Date Value Ref Range Status   02/20/2023 32.0 32.0 - 36.0 g/dL Final     RDW   Date Value Ref Range Status   02/20/2023 13.3 11.5 - 14.5 % Final     Platelets   Date Value Ref Range Status   02/20/2023 320 150 - 450 x10E9/L Final     MPV   Date Value Ref Range Status   01/16/2019 9.5 7.0 - 12.6 CU Final     Sodium   Date Value Ref Range Status   12/05/2023 138 136 - 145 mmol/L Final     Potassium   Date Value Ref Range Status   12/05/2023 4.7 3.5 - 5.3 mmol/L Final     Bicarbonate   Date Value Ref Range Status   12/05/2023 26 21 - 32 mmol/L Final     Urea Nitrogen  "  Date Value Ref Range Status   12/05/2023 17 6 - 23 mg/dL Final     Calcium   Date Value Ref Range Status   12/05/2023 9.5 8.6 - 10.3 mg/dL Final     No results found for: \"PROTIME\", \"PTT\", \"INR\", \"FIBRINOGEN\"      REVIEW OF RADIOLOGY   I have reviewed the following:  Radiology Studies           MRI c-spine 7/3/24:  Developmental narrowing of the cervical spinal canal and superimposed multilevel degenerative changes most striking at C4-5 where there is moderate spinal canal stenosis and ventral cord flattening. No cord signal abnormality.     Scattered neural foraminal stenoses, severe bilaterally at C5-6            ASSESSMENT & PLAN  John Delatorre is a 63 y.o. old male with PMH obesity, HTN, CHON non-adherent to CPAP, pDM2 who presents for F/U LB pain       1) Lumbar PLS after L3-S1 lami 2000, stable  -Relieved his BLE pain, but worsening LBP radiating to L groin  -Refractive to yrs of conservative tx including Tylenol, NSAIDs, tizanidine, MDP, >6 w PT   -MRI L-spine 1/18/24: stable L3-S1 lami, L2-3 disc bulge/LFH contributing to mild canal/BL NF stenosis, left mod-severe vs mild R NFS at L3-4 with disc osteophyte displacing nerve, mod BL NFS at L4-5, mod-sever eL>R NFS at L5-S1  -GIA 2/8/24: 100% ongoing relief radicular sx  -Residual axial LBP- consider MBB vs Intracept pending patient to bring in CD for image review  -Consider SCS if refractive     2) R meralgia paresthetica, improved  -Objective numbness in distribution of R LFCN likely 2/2 obesity  -R LFNCB 11/30: 100% relief (and numbness) for 3 days  -resolved objective R thigh numbness  -Briefly discussed DRG, but pt would like to revisit at a later date    3) Neck pain  -Neck pain since 2019 radiating to BL shoulders associated with BL thumb numbness, left hand weakness without obj deficit most c/w radiculitis  -Refractive to >4 y conservative tx including rest, MDP, Tylenol, ibuprofen, >6 w PT  -Reviewed/discussed MRI c-spine 7/3/24: multilevel " spondylosis featuring C4-5 disc complex with moderate canal stenosis, C5-6 disc complex with severe BL NFS  -Discussed utility of TRISHA, but pt denied    4) BL shoulder pain  -Likely 2/2 OA  -Refractive to Tylenol, NSAIDs  -XR BL 6/24/25: mild ACJ OA BL  -BL GHJ/ACJ/SSNB 8/8/24: 100% ongoing relief  -F/U PT    5) L knee pain  -Mechanical since 2020, likely 2/2 OA  -Refractive to >3 y conservative tx including Tylenol, NSAIDs, MDP, rest   -XR BL knees  -Schedule L knee CSI under US to target pain generator and minimize risk/likelihood of chronic opioid use and/or surgery          Discussed procedure risks/benefits in detail with patient. Pt meets medical necessity for procedure due to failure of conservative measures. Reviewed procedural risks including bleeding, infection, nerve damage, paralysis. Also reviewed mitigating factors such as screening for infection/blood thinner use, sterile precautions, and image-guidance when applicable. All questions answered. Pt/guardian expressed understanding and choose to proceed            Gail Chow MD  Anesthesiologist & Interventional Pain Physician   [facility]  O: [phone]  F: [phone]  3:01 PM  08/26/24

## 2024-08-29 ENCOUNTER — TELEPHONE (OUTPATIENT)
Dept: PAIN MEDICINE | Facility: CLINIC | Age: 64
End: 2024-08-29
Payer: COMMERCIAL

## 2024-08-29 NOTE — TELEPHONE ENCOUNTER
----- Message from Christine HARTMAN sent at 8/28/2024  3:30 PM EDT -----  OK TO SCHEDULE AT [facility]

## 2024-08-30 ENCOUNTER — TELEMEDICINE (OUTPATIENT)
Dept: PAIN MEDICINE | Facility: CLINIC | Age: 64
End: 2024-08-30
Payer: COMMERCIAL

## 2024-08-30 VITALS — WEIGHT: 210 LBS | HEIGHT: 73 IN | BODY MASS INDEX: 27.83 KG/M2

## 2024-08-30 DIAGNOSIS — E11.9 TYPE 2 DIABETES MELLITUS WITHOUT COMPLICATION, WITHOUT LONG-TERM CURRENT USE OF INSULIN (MULTI): Primary | ICD-10-CM

## 2024-08-30 DIAGNOSIS — R63.2 POLYPHAGIA: ICD-10-CM

## 2024-08-30 DIAGNOSIS — Z71.3 DIETARY COUNSELING: ICD-10-CM

## 2024-08-30 DIAGNOSIS — Z71.82 EXERCISE COUNSELING: ICD-10-CM

## 2024-08-30 DIAGNOSIS — E66.3 OVERWEIGHT (BMI 25.0-29.9): ICD-10-CM

## 2024-08-30 PROCEDURE — 99214 OFFICE O/P EST MOD 30 MIN: CPT | Performed by: NURSE PRACTITIONER

## 2024-08-30 PROCEDURE — 99401 PREV MED CNSL INDIV APPRX 15: CPT | Performed by: NURSE PRACTITIONER

## 2024-08-30 PROCEDURE — 1036F TOBACCO NON-USER: CPT | Performed by: NURSE PRACTITIONER

## 2024-08-30 PROCEDURE — 3008F BODY MASS INDEX DOCD: CPT | Performed by: NURSE PRACTITIONER

## 2024-08-30 RX ORDER — TIRZEPATIDE 5 MG/.5ML
5 INJECTION, SOLUTION SUBCUTANEOUS
Qty: 2 ML | Refills: 1 | Status: SHIPPED | OUTPATIENT
Start: 2024-08-30

## 2024-08-30 ASSESSMENT — PAIN SCALES - GENERAL: PAINLEVEL: 0-NO PAIN

## 2024-08-30 NOTE — PROGRESS NOTES
"Subjective   Patient ID:   John Delatorre \"Denilson\" is a 63 y.o. male who presents for Weight Management.     HPI 64 YO Male with Obesity presents for a Weight Management Follow-up. Denilson is currently on Tirzepatide 5mg weekly. He denies persistent N/V/D/C or abdominal pain. However, he does report two incidences of nausea, vomiting, diarrhea, cramping and sulfur burps. He reports this lasted approximately 4 days. He was then fine the following week but then for week 3 he had similar symptoms.      Weight Hx:  Initial Weight: 249.6  Last Visit Weight: 217  Current Weight: 210  Total Loss: 39.6lbs     Diet Recall:  Breakfast: Breakfast burrito  Lunch: fish sandwich fillet  Dinner: Pork chop and salad   Snack(s): none     Current Exercise Regimen: None    Review of Systems Unless noted in the HPI all other systems have been reviewed and are negative for complaint.     Objective   Physical Exam Telehealth Visit:   General- No acute distress, well appearing and well nourished.   Eyes Conjunctiva and lids: No erythema, swelling or discharge  Pulmonary - Respiratory effort: Normal respiration.   Neurologic - Coordination: Normal. A&Ox3.  Psychiatric - Orientation to person, place, and time: Normal. Mood and affect: Normal.    Assessment/Plan   Assessment & Plan  Exercise counseling         Overweight (BMI 25.0-29.9)         Type 2 diabetes mellitus without complication, without long-term current use of insulin (Multi)    Orders:    tirzepatide (Mounjaro) 5 mg/0.5 mL pen injector; Inject 5 mg under the skin every 7 days.    Polyphagia         Dietary counseling         TREATMENT PLAN:  64 YO Male with Obesity here\" for Weight Management.  Current BMI: 27   Total Loss: 39.6lbs  Currently on 5mg weekly of Tirzepatide; tolerating for the most-part.   Discussed with him potential \"triggers\" and what \"dumping syndrome;\" is as well as \"reactive hypoglycemia.\"   Will continue with current dose of Tirzepatide x 4 more weeks then " reassess.   Patient denies personal or family hx of MTC, MENII or Pancreatitis.   Risks and benefits discussed.   > 50% of time spent counseling on the importance of following recommended dietary modifications including: role of diet, low calorie and carbohydrate restrictions, limiting fast food and avoiding high sugary beverages.   Also discussed, the role of exercise with an ultimate goal of at least 250-300 minutes a week for weight loss and weight maintenance.  Follow-up in 4 weeks or sooner, as needed.

## 2024-08-30 NOTE — H&P (VIEW-ONLY)
"Subjective   Patient ID:   John Delatorre \"Denilson\" is a 63 y.o. male who presents for Weight Management.     HPI 62 YO Male with Obesity presents for a Weight Management Follow-up. Denilson is currently on Tirzepatide 5mg weekly. He denies persistent N/V/D/C or abdominal pain. However, he does report two incidences of nausea, vomiting, diarrhea, cramping and sulfur burps. He reports this lasted approximately 4 days. He was then fine the following week but then for week 3 he had similar symptoms.      Weight Hx:  Initial Weight: 249.6  Last Visit Weight: 217  Current Weight: 210  Total Loss: 39.6lbs     Diet Recall:  Breakfast: Breakfast burrito  Lunch: fish sandwich fillet  Dinner: Pork chop and salad   Snack(s): none     Current Exercise Regimen: None    Review of Systems Unless noted in the HPI all other systems have been reviewed and are negative for complaint.     Objective   Physical Exam Telehealth Visit:   General- No acute distress, well appearing and well nourished.   Eyes Conjunctiva and lids: No erythema, swelling or discharge  Pulmonary - Respiratory effort: Normal respiration.   Neurologic - Coordination: Normal. A&Ox3.  Psychiatric - Orientation to person, place, and time: Normal. Mood and affect: Normal.    Assessment/Plan   Assessment & Plan  Exercise counseling         Overweight (BMI 25.0-29.9)         Type 2 diabetes mellitus without complication, without long-term current use of insulin (Multi)    Orders:    tirzepatide (Mounjaro) 5 mg/0.5 mL pen injector; Inject 5 mg under the skin every 7 days.    Polyphagia         Dietary counseling         TREATMENT PLAN:  62 YO Male with Obesity here\" for Weight Management.  Current BMI: 27   Total Loss: 39.6lbs  Currently on 5mg weekly of Tirzepatide; tolerating for the most-part.   Discussed with him potential \"triggers\" and what \"dumping syndrome;\" is as well as \"reactive hypoglycemia.\"   Will continue with current dose of Tirzepatide x 4 more weeks then " reassess.   Patient denies personal or family hx of MTC, MENII or Pancreatitis.   Risks and benefits discussed.   > 50% of time spent counseling on the importance of following recommended dietary modifications including: role of diet, low calorie and carbohydrate restrictions, limiting fast food and avoiding high sugary beverages.   Also discussed, the role of exercise with an ultimate goal of at least 250-300 minutes a week for weight loss and weight maintenance.  Follow-up in 4 weeks or sooner, as needed.

## 2024-09-12 ENCOUNTER — HOSPITAL ENCOUNTER (OUTPATIENT)
Dept: GASTROENTEROLOGY | Facility: HOSPITAL | Age: 64
Discharge: HOME | End: 2024-09-12
Payer: COMMERCIAL

## 2024-09-12 ENCOUNTER — APPOINTMENT (OUTPATIENT)
Dept: RADIOLOGY | Facility: HOSPITAL | Age: 64
End: 2024-09-12
Payer: COMMERCIAL

## 2024-09-12 VITALS
OXYGEN SATURATION: 99 % | HEART RATE: 36 BPM | HEIGHT: 73 IN | SYSTOLIC BLOOD PRESSURE: 120 MMHG | TEMPERATURE: 96.8 F | DIASTOLIC BLOOD PRESSURE: 77 MMHG | WEIGHT: 210 LBS | BODY MASS INDEX: 27.83 KG/M2 | RESPIRATION RATE: 16 BRPM

## 2024-09-12 DIAGNOSIS — M25.562 CHRONIC PAIN OF LEFT KNEE: ICD-10-CM

## 2024-09-12 DIAGNOSIS — G89.29 CHRONIC PAIN OF LEFT KNEE: ICD-10-CM

## 2024-09-12 LAB — GLUCOSE BLD MANUAL STRIP-MCNC: 106 MG/DL (ref 74–99)

## 2024-09-12 PROCEDURE — 2500000004 HC RX 250 GENERAL PHARMACY W/ HCPCS (ALT 636 FOR OP/ED): Performed by: ANESTHESIOLOGY

## 2024-09-12 PROCEDURE — 20611 DRAIN/INJ JOINT/BURSA W/US: CPT | Performed by: ANESTHESIOLOGY

## 2024-09-12 PROCEDURE — 82947 ASSAY GLUCOSE BLOOD QUANT: CPT

## 2024-09-12 RX ORDER — METHYLPREDNISOLONE ACETATE 40 MG/ML
INJECTION, SUSPENSION INTRA-ARTICULAR; INTRALESIONAL; INTRAMUSCULAR; SOFT TISSUE AS NEEDED
Status: COMPLETED | OUTPATIENT
Start: 2024-09-12 | End: 2024-09-12

## 2024-09-12 RX ORDER — BUPIVACAINE HYDROCHLORIDE 5 MG/ML
INJECTION, SOLUTION EPIDURAL; INTRACAUDAL AS NEEDED
Status: COMPLETED | OUTPATIENT
Start: 2024-09-12 | End: 2024-09-12

## 2024-09-12 RX ORDER — LIDOCAINE HYDROCHLORIDE 10 MG/ML
INJECTION, SOLUTION EPIDURAL; INFILTRATION; INTRACAUDAL; PERINEURAL AS NEEDED
Status: COMPLETED | OUTPATIENT
Start: 2024-09-12 | End: 2024-09-12

## 2024-09-12 ASSESSMENT — PAIN - FUNCTIONAL ASSESSMENT
PAIN_FUNCTIONAL_ASSESSMENT: 0-10
PAIN_FUNCTIONAL_ASSESSMENT: 0-10

## 2024-09-12 ASSESSMENT — ENCOUNTER SYMPTOMS
DEPRESSION: 0
LOSS OF SENSATION IN FEET: 0
OCCASIONAL FEELINGS OF UNSTEADINESS: 0

## 2024-09-12 ASSESSMENT — PAIN SCALES - GENERAL
PAINLEVEL_OUTOF10: 0 - NO PAIN
PAINLEVEL_OUTOF10: 8

## 2024-09-12 NOTE — INTERVAL H&P NOTE
H&P reviewed. The patient was examined and there are no changes to the H&P. Denilson Delatorre is a 63 y.o. male with PMH  obesity, HTN, CHON non-adherent to CPAP, pDM2 who presents for L knee CSI under US to target pain generator and minimize risk/likelihood of chronic opioid use and/or surgery. Patient's pain stable and persistent from last visit.  No personal/family hx issues with anesthesia. Denies allergies to Latex, steroids, local anesthetics, or iodine/contrast. Denies being on blood thinners. Not diabetic.  Denies fever, chills, NS, CP, SOB, cough, N/V.    Discussed procedure risks/benefits in detail with patient. Pt meets medical necessity for procedure due to failure of conservative measures. Reviewed procedural risks including bleeding, infection, nerve damage, paralysis. Also reviewed mitigating factors such as screening for infection/blood thinner use, sterile precautions, and image-guidance when applicable. All questions answered. Pt/guardian expressed understanding and choose to proceed      Gail Chow MD  Anesthesiologist & Interventional Pain Physician   [facility]  O: [phone]  F: [phone]  10:35 AM  09/12/24

## 2024-09-12 NOTE — DISCHARGE INSTRUCTIONS
DISCHARGE INSTRUCTIONS FOR INJECTIONS     You underwent a left knee injection today    Aftermost injections, it is recommended that you relax and limit your activity for the remainder of the day unless you have been told otherwise by your pain physician.  You should not drive a car, operate machinery, or make important legal decisions unless otherwise directed by your pain physician.  You may resume your normal activity, including exercise, tomorrow.      Keep a written pain diary of how much pain relief you experienced following the injection procedure and the length of time of pain relief you experienced pain relief. Following diagnostic injections like medial branch nerve blocks, sacroiliac joint blocks, stellate ganglion injections and other blocks, it is very important you record the specific amount of pain relief you experienced immediately after the injectionand how long it lasted. Your doctor will ask you for this information at your follow up visit.     For all injections, please keep the injection site dry and inspect the site for a couple of days. You may remove the Band-Aid the day of the injection at any time.     Some discomfort, bruising or slight swelling may occur at the injection site. This is not abnormal if it occurs.  If needed you may:    -Take over the counter medication such as Tylenol or Motrin.   -Apply an ice pack for 30 minutes, 2 to 3 times a day for the first 24 hours.     You may shower today; no soaking baths, hot tubs, whirlpools or swimming pools for two days.      If you are given steroids in your injection, it may take 3-5 days for the steroid medication to take effect. You may notice a worsening of your symptoms for 1-2 days after the injection. This is not abnormal.  You may use acetaminophen, ibuprofen, or prescription medication that your doctor may have prescribed for you if you need to do so.     A few common side effects of steroids include facial flushing, sweating,  restlessness, irritability,difficulty sleeping, increase in blood sugar, and increased blood pressure. If you have diabetes, please monitor your blood sugar at least once a day for at least 5 days. If you have poorly controlled high blood pressure, monitoryour blood pressure for at least 2 days and contact your primary care physician if these numbers are unusually high for you.      If you take aspirin or non-steroidal anti-inflammatory drugs (examples are Motrin, Advil, ibuprofen, Naprosyn, Voltaren, Relafen, etc.) you may restart these this evening, but stop taking it 3 days before your next appointment, unless instructed otherwiseby your physician.      You do not need to discontinue non-aspirin-containing pain medications prior to an injection (examples: Celebrex, tramadol, hydrocodone and acetaminophen).      If you take a blood thinning medication (Coumadin, Lovenox, Fragmin,Ticlid, Plavix, Pradaxa, etc.), please discuss this with your primary care physician/cardiologist and your pain physician. These medications MUST be discontinued before you can have an injection safely, without the risk of uncontrolled bleeding. If these medications are not discontinued for an appropriate period of time, you will not be able to receivean injection.      If you are taking Coumadin, please have your INR checked the morning of your procedure and bringthe result to your appointment unless otherwise instructed. If your INR is over 1.2, your injection may need to be rescheduled to avoid uncontrolled bleeding from the needle placement.     Call [facility] at [phone] between 8am-4pm Monday - Friday if you are experiencing the following:    If you received an epidural or spinal injection:    -Headache that doesnot go away with medicine, is worse when sitting or standing up, and is greatly relieved upon lying down.   -Severe pain worse than or different than your baseline pain.   -Chills or fever (101º F or  greater).   -Drainage or signs of infection at the injection site     Go directly to the Emergency Department if you are experiencing the following and received an epidural or spinal injection:   -Abrupt weakness or progressive weakness in your legs that starts after you leave the clinic.   -Abrupt severe or worsening numbness in your legs.   -Inability to urinate after the injection or loss of bowel or bladder control without the urge to defecate or urinate.     If you have a clinical question that cannot wait until your next appointment, please call [phone] between 8am-4pm Monday - Friday or send a [employer] message. We do our best to return all non-emergency messages within 24 hours, Monday - Friday. A nurse or physician will return your message.      If you need to cancel an appointment, please call the scheduling staff at [phone] during normal business hours or leave a message at least 24 hours in advance.     If you are going to be sedated for your next procedure, you MUST have responsible adult who can legally drive accompany you home. You cannot eat or drink for eight hours prior to the planned procedure if you are going to receive sedation. You may take your non-blood thinning medications with a small sip of water.

## 2024-09-20 ENCOUNTER — TELEMEDICINE (OUTPATIENT)
Dept: PAIN MEDICINE | Facility: CLINIC | Age: 64
End: 2024-09-20
Payer: COMMERCIAL

## 2024-09-20 VITALS — WEIGHT: 208 LBS | BODY MASS INDEX: 27.44 KG/M2

## 2024-09-20 DIAGNOSIS — Z71.3 DIETARY COUNSELING: ICD-10-CM

## 2024-09-20 DIAGNOSIS — R63.2 POLYPHAGIA: ICD-10-CM

## 2024-09-20 DIAGNOSIS — E66.3 OVERWEIGHT (BMI 25.0-29.9): ICD-10-CM

## 2024-09-20 DIAGNOSIS — E11.9 TYPE 2 DIABETES MELLITUS WITHOUT COMPLICATION, WITHOUT LONG-TERM CURRENT USE OF INSULIN (MULTI): Primary | ICD-10-CM

## 2024-09-20 DIAGNOSIS — Z71.82 EXERCISE COUNSELING: ICD-10-CM

## 2024-09-20 PROCEDURE — 1036F TOBACCO NON-USER: CPT | Performed by: NURSE PRACTITIONER

## 2024-09-20 PROCEDURE — 99214 OFFICE O/P EST MOD 30 MIN: CPT | Performed by: NURSE PRACTITIONER

## 2024-09-20 PROCEDURE — 99401 PREV MED CNSL INDIV APPRX 15: CPT | Performed by: NURSE PRACTITIONER

## 2024-09-20 ASSESSMENT — PATIENT HEALTH QUESTIONNAIRE - PHQ9
1. LITTLE INTEREST OR PLEASURE IN DOING THINGS: NOT AT ALL
2. FEELING DOWN, DEPRESSED OR HOPELESS: NEARLY EVERY DAY
SUM OF ALL RESPONSES TO PHQ9 QUESTIONS 1 AND 2: 3

## 2024-09-20 ASSESSMENT — ENCOUNTER SYMPTOMS: OCCASIONAL FEELINGS OF UNSTEADINESS: 0

## 2024-09-20 ASSESSMENT — PAIN - FUNCTIONAL ASSESSMENT: PAIN_FUNCTIONAL_ASSESSMENT: NO/DENIES PAIN

## 2024-09-20 NOTE — PROGRESS NOTES
"Subjective   Patient ID: John Delatorre \"Denilson\" is a 63 y.o. male who presents for No chief complaint on file..    HPI 62 YO Male with Obesity presents for a Weight Management Follow-up. Denilson is currently on Tirzepatide 5mg weekly. He denies persistent N/V/D/C or abdominal pain. cramping and sulfur burps. He reports this lasted approximately 4 days. He continues to lose weight and inches and is pleased with his results thus far.       Weight Hx:  Initial Weight: 249.6  Last Visit Weight: 210  Current Weight: 208  Total Loss: 41.6     Diet Recall:  Breakfast: Breakfast burrito  Lunch: Cabbage soup  Dinner: Fish and vegetables  Snack(s): No     Current Exercise Regimen: walking, kettleball squats/curls/rowing, dumbells, presses/sidelifts    Review of Systems Unless noted in the HPI all other systems have been reviewed and are negative for complaint.     Objective   Physical Exam Telehealth Visit:  General- No acute distress, well appearing and well nourished.   Eyes Conjunctiva and lids: No erythema, swelling or discharge  Pulmonary - Respiratory effort: Normal respiration.   Neurologic - Coordination: Normal. A&Ox3.  Psychiatric - Orientation to person, place, and time: Normal. Mood and affect: Normal.    Assessment/Plan       TREATMENT PLAN:  62 YO Male with Obesity here\" for Weight Management.  Current BMI: 27  Total Loss: 41.6lbs  Currently on 5mg weekly of Tirzepatide; tolerating for the most-part.   Discussed with him potential \"triggers\" and what \"dumping syndrome;\" is as well as \"reactive hypoglycemia.\"   Will continue with current dose of Tirzepatide x 4 more weeks then reassess.   Patient denies personal or family hx of MTC, MENII or Pancreatitis.   Risks and benefits discussed.   > 50% of time spent counseling on the importance of following recommended dietary modifications including: role of diet, low calorie and carbohydrate restrictions, limiting fast food and avoiding high sugary beverages.   Also " discussed, the role of exercise with an ultimate goal of at least 250-300 minutes a week for weight loss and weight maintenance.  Follow-up in 4 weeks or sooner, as needed.

## 2024-09-27 ENCOUNTER — APPOINTMENT (OUTPATIENT)
Dept: PAIN MEDICINE | Facility: CLINIC | Age: 64
End: 2024-09-27
Payer: COMMERCIAL

## 2024-10-02 ENCOUNTER — LAB (OUTPATIENT)
Dept: LAB | Facility: LAB | Age: 64
End: 2024-10-02
Payer: COMMERCIAL

## 2024-10-02 DIAGNOSIS — E66.3 OVERWEIGHT (BMI 25.0-29.9): ICD-10-CM

## 2024-10-02 DIAGNOSIS — E11.9 TYPE 2 DIABETES MELLITUS WITHOUT COMPLICATION, WITHOUT LONG-TERM CURRENT USE OF INSULIN (MULTI): ICD-10-CM

## 2024-10-02 DIAGNOSIS — E78.2 COMBINED HYPERLIPIDEMIA: ICD-10-CM

## 2024-10-02 LAB
25(OH)D3 SERPL-MCNC: 43 NG/ML (ref 30–100)
ALBUMIN SERPL BCP-MCNC: 4.5 G/DL (ref 3.4–5)
ALP SERPL-CCNC: 64 U/L (ref 33–136)
ALT SERPL W P-5'-P-CCNC: 27 U/L (ref 10–52)
ANION GAP SERPL CALC-SCNC: 14 MMOL/L (ref 10–20)
AST SERPL W P-5'-P-CCNC: 21 U/L (ref 9–39)
BILIRUB SERPL-MCNC: 0.7 MG/DL (ref 0–1.2)
BUN SERPL-MCNC: 20 MG/DL (ref 6–23)
CALCIUM SERPL-MCNC: 9.2 MG/DL (ref 8.6–10.3)
CHLORIDE SERPL-SCNC: 104 MMOL/L (ref 98–107)
CHOLEST SERPL-MCNC: 213 MG/DL (ref 0–199)
CHOLESTEROL/HDL RATIO: 5.3
CO2 SERPL-SCNC: 27 MMOL/L (ref 21–32)
CREAT SERPL-MCNC: 0.84 MG/DL (ref 0.5–1.3)
CREAT UR-MCNC: 140.4 MG/DL (ref 20–370)
EGFRCR SERPLBLD CKD-EPI 2021: >90 ML/MIN/1.73M*2
EST. AVERAGE GLUCOSE BLD GHB EST-MCNC: 108 MG/DL
GLUCOSE SERPL-MCNC: 89 MG/DL (ref 74–99)
HBA1C MFR BLD: 5.4 %
HDLC SERPL-MCNC: 40.2 MG/DL
INSULIN P FAST SERPL-ACNC: 9 UIU/ML (ref 3–25)
LDLC SERPL CALC-MCNC: 137 MG/DL
MICROALBUMIN UR-MCNC: <7 MG/L
MICROALBUMIN/CREAT UR: NORMAL MG/G{CREAT}
NON HDL CHOLESTEROL: 173 MG/DL (ref 0–149)
POTASSIUM SERPL-SCNC: 4.7 MMOL/L (ref 3.5–5.3)
PROT SERPL-MCNC: 7 G/DL (ref 6.4–8.2)
SODIUM SERPL-SCNC: 140 MMOL/L (ref 136–145)
TRIGL SERPL-MCNC: 179 MG/DL (ref 0–149)
VLDL: 36 MG/DL (ref 0–40)

## 2024-10-02 PROCEDURE — 36415 COLL VENOUS BLD VENIPUNCTURE: CPT

## 2024-10-02 PROCEDURE — 80053 COMPREHEN METABOLIC PANEL: CPT

## 2024-10-02 PROCEDURE — 82306 VITAMIN D 25 HYDROXY: CPT

## 2024-10-02 PROCEDURE — 83525 ASSAY OF INSULIN: CPT

## 2024-10-02 PROCEDURE — 82570 ASSAY OF URINE CREATININE: CPT

## 2024-10-02 PROCEDURE — 83036 HEMOGLOBIN GLYCOSYLATED A1C: CPT

## 2024-10-02 PROCEDURE — 82043 UR ALBUMIN QUANTITATIVE: CPT

## 2024-10-02 PROCEDURE — 80061 LIPID PANEL: CPT

## 2024-10-03 PROBLEM — G72.0 STATIN MYOPATHY: Status: ACTIVE | Noted: 2024-10-03

## 2024-10-03 PROBLEM — T46.6X5A STATIN MYOPATHY: Status: ACTIVE | Noted: 2024-10-03

## 2024-10-15 ENCOUNTER — HOSPITAL ENCOUNTER (OUTPATIENT)
Dept: RADIOLOGY | Facility: CLINIC | Age: 64
Discharge: HOME | End: 2024-10-15
Payer: COMMERCIAL

## 2024-10-15 DIAGNOSIS — M20.21 HALLUX RIGIDUS, RIGHT FOOT: ICD-10-CM

## 2024-10-15 PROCEDURE — 73620 X-RAY EXAM OF FOOT: CPT | Mod: RT,RSC

## 2024-10-18 ENCOUNTER — TELEMEDICINE (OUTPATIENT)
Dept: PAIN MEDICINE | Facility: CLINIC | Age: 64
End: 2024-10-18
Payer: COMMERCIAL

## 2024-10-18 DIAGNOSIS — R63.2 POLYPHAGIA: ICD-10-CM

## 2024-10-18 DIAGNOSIS — E11.9 TYPE 2 DIABETES MELLITUS WITHOUT COMPLICATION, WITHOUT LONG-TERM CURRENT USE OF INSULIN (MULTI): Primary | ICD-10-CM

## 2024-10-18 DIAGNOSIS — E66.3 OVERWEIGHT (BMI 25.0-29.9): ICD-10-CM

## 2024-10-18 DIAGNOSIS — Z71.3 DIETARY COUNSELING: ICD-10-CM

## 2024-10-18 DIAGNOSIS — Z71.82 EXERCISE COUNSELING: ICD-10-CM

## 2024-10-18 PROCEDURE — 99401 PREV MED CNSL INDIV APPRX 15: CPT | Performed by: NURSE PRACTITIONER

## 2024-10-18 PROCEDURE — 1036F TOBACCO NON-USER: CPT | Performed by: NURSE PRACTITIONER

## 2024-10-18 PROCEDURE — 3050F LDL-C >= 130 MG/DL: CPT | Performed by: NURSE PRACTITIONER

## 2024-10-18 PROCEDURE — 3062F POS MACROALBUMINURIA REV: CPT | Performed by: NURSE PRACTITIONER

## 2024-10-18 PROCEDURE — 99214 OFFICE O/P EST MOD 30 MIN: CPT | Performed by: NURSE PRACTITIONER

## 2024-10-18 PROCEDURE — 3044F HG A1C LEVEL LT 7.0%: CPT | Performed by: NURSE PRACTITIONER

## 2024-10-18 RX ORDER — TIRZEPATIDE 5 MG/.5ML
5 INJECTION, SOLUTION SUBCUTANEOUS
Qty: 2 ML | Refills: 1 | Status: SHIPPED | OUTPATIENT
Start: 2024-10-18

## 2024-10-18 ASSESSMENT — PAIN SCALES - GENERAL
PAINLEVEL_OUTOF10: 3
PAINLEVEL_OUTOF10: 3

## 2024-10-18 ASSESSMENT — ENCOUNTER SYMPTOMS
DEPRESSION: 0
LOSS OF SENSATION IN FEET: 0
OCCASIONAL FEELINGS OF UNSTEADINESS: 0

## 2024-10-18 ASSESSMENT — PAIN - FUNCTIONAL ASSESSMENT: PAIN_FUNCTIONAL_ASSESSMENT: 0-10

## 2024-10-18 NOTE — PROGRESS NOTES
"Subjective   Patient ID: John Delatorre \"Darian" is a 63 y.o. male who presents for Weight Management.     Virtual or Telephone Consent    An interactive audio and video telecommunication system which permits real time communications between the patient (at the originating site) and provider (at the distant site) was utilized to provide this telehealth service.   Verbal consent was requested and obtained from John Delatorre on this date, 10/18/24 for a telehealth visit.     HPI 62 YO Male with Obesity presents for a Weight Management Follow-up. Denilson is currently on Tirzepatide 5mg weekly. He denies persistent N/V/D/C or abdominal pain. Denilson underwent surgery to his right foot since our last visit and he has had a difficult recovery.  He is a bit disgusted at this point and he has not restarted his Mounjaro from when he was told to stop leading up to surgery.  At this point he is unsure if he wants to continue with this long-term.  He would like to try injecting every other week for the next 6 weeks to see how he feels and if his weight increases at all.  At this time he is just feeling unwell post surgery and would like a chance to clean out his system before restarting another medication on top of what he is already going through with his foot.     Weight Hx:  Initial Weight: 249.6  Last Visit Weight: 208  Current Weight: 205   Total Loss: 44.6lbs     Diet Recall: (was not feeling well yesterday)  Breakfast:  N/A  Lunch:  N/A  Dinner: N/A  Snack(s): BANANA      Current Exercise Regimen: N/A (Recent RT foot surgery- 10/15/2024)    Previous Exercise Regimen: walking, kettleball squats/curls/rowing, dumbells, presses/sidelifts    Review of Systems Unless noted in the HPI all other systems have been reviewed and are negative for complaint.     Objective   Physical Exam Telehealth Visit:  General- No acute distress, well appearing and well nourished.   Eyes Conjunctiva and lids: No erythema, swelling or discharge  Pulmonary " "- Respiratory effort: Normal respiration.   Neurologic - Coordination: Normal. A&Ox3.  Psychiatric - Orientation to person, place, and time: Normal. Mood and affect: Normal.    Assessment/Plan   Assessment & Plan  Type 2 diabetes mellitus without complication, without long-term current use of insulin (Multi)    Orders:    tirzepatide (Mounjaro) 5 mg/0.5 mL pen injector; Inject 5 mg under the skin every 7 days.    Exercise counseling         Dietary counseling         Polyphagia         Overweight (BMI 25.0-29.9)         TREATMENT PLAN:  64 YO Male with Obesity here\" for Weight Management.  Current BMI: 27  Total Loss: 44.6lbs  Currently on 5mg weekly of Tirzepatide; tolerating for the most-part.   Discussed with him potential \"triggers\" and what \"dumping syndrome;\" is as well as \"reactive hypoglycemia.\"   Will continue with current dose of Tirzepatide x 6 more weeks then reassess.   Patient denies personal or family hx of MTC, MENII or Pancreatitis.   Risks and benefits discussed.   > 50% of time spent counseling on the importance of following recommended dietary modifications including: role of diet, low calorie and carbohydrate restrictions, limiting fast food and avoiding high sugary beverages.   Also discussed, the role of exercise with an ultimate goal of at least 250-300 minutes a week for weight loss and weight maintenance.  Follow-up in 6 weeks or sooner, as needed.  "

## 2024-10-30 NOTE — NURSING NOTE
Problem: Chronic Conditions and Co-morbidities  Goal: Patient's chronic conditions and co-morbidity symptoms are monitored and maintained or improved  10/29/2024 2341 by Marilyn Quinones RN  Outcome: Progressing  10/29/2024 1403 by Zita Fatima RN  Outcome: Progressing     Problem: Discharge Planning  Goal: Discharge to home or other facility with appropriate resources  10/29/2024 2341 by Marilyn Quinones RN  Outcome: Progressing  10/29/2024 1403 by Zita Fatima RN  Outcome: Progressing     Problem: Safety - Adult  Goal: Free from fall injury  10/29/2024 2341 by Marilyn Quinones RN  Outcome: Progressing  10/29/2024 1403 by Zita Fatima RN  Outcome: Progressing     Problem: Skin/Tissue Integrity  Goal: Absence of new skin breakdown  Description: 1.  Monitor for areas of redness and/or skin breakdown  2.  Assess vascular access sites hourly  3.  Every 4-6 hours minimum:  Change oxygen saturation probe site  4.  Every 4-6 hours:  If on nasal continuous positive airway pressure, respiratory therapy assess nares and determine need for appliance change or resting period.  10/29/2024 2341 by Marilyn Quinones RN  Outcome: Progressing  10/29/2024 1403 by Zita Fatima RN  Outcome: Progressing     Problem: Neurosensory - Adult  Goal: Achieves stable or improved neurological status  10/29/2024 2341 by Marilyn Quinones RN  Outcome: Progressing  10/29/2024 1403 by Zita Fatima RN  Outcome: Progressing  Goal: Absence of seizures  10/29/2024 2341 by Marilyn Quinones RN  Outcome: Progressing  10/29/2024 1403 by Zita Fatima RN  Outcome: Progressing  Goal: Remains free of injury related to seizures activity  10/29/2024 2341 by Marilyn Quinones RN  Outcome: Progressing  10/29/2024 1403 by Zita Fatima RN  Outcome: Progressing  Goal: Achieves maximal functionality and self care  10/29/2024 2341 by Marilyn Quinones RN  Outcome: Progressing  10/29/2024 1403 by Zita Fatima RN  Outcome:  1037: Pt returned alert and oriented. Pt is not in any distress. Pt states pain is a 0/10. Band aid to back is c/d/I.     1042: Pt is able to stand and ambulate without difficulty. Pt educated on discharge instructions.

## 2024-11-08 ENCOUNTER — TELEPHONE (OUTPATIENT)
Dept: PAIN MEDICINE | Facility: CLINIC | Age: 64
End: 2024-11-08

## 2024-11-18 ENCOUNTER — APPOINTMENT (OUTPATIENT)
Dept: PAIN MEDICINE | Facility: CLINIC | Age: 64
End: 2024-11-18
Payer: COMMERCIAL

## 2024-11-21 ENCOUNTER — PATIENT MESSAGE (OUTPATIENT)
Dept: PRIMARY CARE | Facility: CLINIC | Age: 64
End: 2024-11-21
Payer: COMMERCIAL

## 2024-11-21 DIAGNOSIS — E11.9 TYPE 2 DIABETES MELLITUS WITHOUT COMPLICATION, WITHOUT LONG-TERM CURRENT USE OF INSULIN (MULTI): ICD-10-CM

## 2024-11-24 RX ORDER — TIRZEPATIDE 5 MG/.5ML
5 INJECTION, SOLUTION SUBCUTANEOUS
Qty: 2 ML | Refills: 1 | Status: SHIPPED | OUTPATIENT
Start: 2024-11-24

## 2025-01-24 ENCOUNTER — APPOINTMENT (OUTPATIENT)
Dept: PRIMARY CARE | Facility: CLINIC | Age: 65
End: 2025-01-24
Payer: COMMERCIAL

## 2025-01-30 ENCOUNTER — TELEPHONE (OUTPATIENT)
Dept: PRIMARY CARE | Facility: CLINIC | Age: 65
End: 2025-01-30

## 2025-01-30 NOTE — TELEPHONE ENCOUNTER
John came in late to his appointment today. He didn't realize he was scheduled at 7:15 AM and was almost 30 minute's  late. He needs to be rescheduled for a PE. John's # [phone]

## 2025-02-03 ENCOUNTER — TELEPHONE (OUTPATIENT)
Dept: UROLOGY | Facility: CLINIC | Age: 65
End: 2025-02-03
Payer: COMMERCIAL

## 2025-02-04 ENCOUNTER — APPOINTMENT (OUTPATIENT)
Dept: UROLOGY | Facility: CLINIC | Age: 65
End: 2025-02-04
Payer: COMMERCIAL

## 2025-02-26 ENCOUNTER — OFFICE VISIT (OUTPATIENT)
Dept: PRIMARY CARE | Facility: CLINIC | Age: 65
End: 2025-02-26
Payer: COMMERCIAL

## 2025-02-26 VITALS
HEART RATE: 71 BPM | WEIGHT: 215.2 LBS | SYSTOLIC BLOOD PRESSURE: 122 MMHG | TEMPERATURE: 97.3 F | OXYGEN SATURATION: 98 % | DIASTOLIC BLOOD PRESSURE: 74 MMHG | BODY MASS INDEX: 28.39 KG/M2

## 2025-02-26 DIAGNOSIS — Z12.5 SCREENING FOR PROSTATE CANCER: ICD-10-CM

## 2025-02-26 DIAGNOSIS — E11.9 TYPE 2 DIABETES MELLITUS WITHOUT COMPLICATION, WITHOUT LONG-TERM CURRENT USE OF INSULIN (MULTI): ICD-10-CM

## 2025-02-26 DIAGNOSIS — I10 BENIGN HYPERTENSION: ICD-10-CM

## 2025-02-26 DIAGNOSIS — Z00.00 ROUTINE GENERAL MEDICAL EXAMINATION AT A HEALTH CARE FACILITY: Primary | ICD-10-CM

## 2025-02-26 LAB — POC HEMOGLOBIN A1C: 5.5 % (ref 4.2–6.5)

## 2025-02-26 PROCEDURE — 83036 HEMOGLOBIN GLYCOSYLATED A1C: CPT | Performed by: FAMILY MEDICINE

## 2025-02-26 PROCEDURE — 1036F TOBACCO NON-USER: CPT | Performed by: FAMILY MEDICINE

## 2025-02-26 PROCEDURE — 3078F DIAST BP <80 MM HG: CPT | Performed by: FAMILY MEDICINE

## 2025-02-26 PROCEDURE — 3074F SYST BP LT 130 MM HG: CPT | Performed by: FAMILY MEDICINE

## 2025-02-26 PROCEDURE — 99396 PREV VISIT EST AGE 40-64: CPT | Performed by: FAMILY MEDICINE

## 2025-02-26 RX ORDER — SIMVASTATIN 10 MG/1
10 TABLET, FILM COATED ORAL NIGHTLY
COMMUNITY

## 2025-02-26 ASSESSMENT — PROMIS GLOBAL HEALTH SCALE
CARRYOUT_SOCIAL_ACTIVITIES: GOOD
EMOTIONAL_PROBLEMS: RARELY
RATE_GENERAL_HEALTH: FAIR
CARRYOUT_PHYSICAL_ACTIVITIES: MOSTLY
RATE_AVERAGE_FATIGUE: MILD
RATE_PHYSICAL_HEALTH: FAIR
RATE_SOCIAL_SATISFACTION: GOOD
RATE_AVERAGE_PAIN: 4
RATE_QUALITY_OF_LIFE: GOOD
RATE_MENTAL_HEALTH: GOOD

## 2025-02-26 ASSESSMENT — LIFESTYLE VARIABLES
SKIP TO QUESTIONS 9-10: 1
HOW MANY STANDARD DRINKS CONTAINING ALCOHOL DO YOU HAVE ON A TYPICAL DAY: 1 OR 2
HOW OFTEN DO YOU HAVE SIX OR MORE DRINKS ON ONE OCCASION: NEVER
HOW OFTEN DO YOU HAVE A DRINK CONTAINING ALCOHOL: 2-3 TIMES A WEEK
AUDIT-C TOTAL SCORE: 3

## 2025-02-26 ASSESSMENT — PAIN SCALES - GENERAL: PAINLEVEL_OUTOF10: 0-NO PAIN

## 2025-02-26 ASSESSMENT — PATIENT HEALTH QUESTIONNAIRE - PHQ9
1. LITTLE INTEREST OR PLEASURE IN DOING THINGS: NOT AT ALL
2. FEELING DOWN, DEPRESSED OR HOPELESS: NOT AT ALL
SUM OF ALL RESPONSES TO PHQ9 QUESTIONS 1 & 2: 0

## 2025-02-26 ASSESSMENT — ENCOUNTER SYMPTOMS
DEPRESSION: 0
OCCASIONAL FEELINGS OF UNSTEADINESS: 0
LOSS OF SENSATION IN FEET: 0

## 2025-02-26 NOTE — PROGRESS NOTES
Subjective   Patient ID: Denilson Delatorre is a 64 y.o. male who presents for Annual Exam.    Here for annual physical.  Still working.  Working out.  More active.      Osteoarthritis/Pain Management  -History of: Post Laminectomy Syndrome  -F/U: Seeing pain management - had epidural injection.  Had MRI.  Pt had injections recently - doing well.  Pain manageable.  Back is improved.    -Treatment: Epidural injection  -Past Evaluation: Mri, xrays  -Specialist: Cindy Castaneda  -Past Medications:    For DM2:  -A1c: 5.5  -Follow up:  Seeing Linda Mota - started on ozempic. No longer seeing her - no on Mounjaro- Max weight 256 - down to 215.   -Hypoglycemia: none  -Glucose monitoring: none    Dyslipidemia  -F/U:  Taking daily - tolerating well.    -Compliance with treatment thus far has been good.               Diabetes         Review of Systems    Objective   /74 (BP Location: Right arm, Patient Position: Sitting)   Pulse 71   Temp 36.3 °C (97.3 °F) (Temporal)   Wt 97.6 kg (215 lb 3.2 oz)   SpO2 98%   BMI 28.39 kg/m²     Physical Exam  Vitals reviewed.   Constitutional:       General: He is not in acute distress.  Cardiovascular:      Rate and Rhythm: Normal rate and regular rhythm.   Pulmonary:      Effort: Pulmonary effort is normal.      Breath sounds: No wheezing or rhonchi.   Musculoskeletal:      Right lower leg: No edema.      Left lower leg: No edema.   Lymphadenopathy:      Cervical: No cervical adenopathy.   Neurological:      Mental Status: He is alert.         Assessment/Plan   Diagnoses and all orders for this visit:  Routine general medical examination at a health care facility  Type 2 diabetes mellitus without complication, without long-term current use of insulin (Multi)  Benign hypertension    Patient Instructions   Here for physical.      For Dm2 - A1c is 5.5. Tolerating mounjaro.  Continue with exercise.  Start resistance training.     For cholesterol - doing well with  simvastatin.      For immunizations - you can get the following at the pharmacy:  Tdap (tetanus) and Prevnar 20 (pneumonia).  Check on 2nd shingles shot.      Follow up in 6 months.

## 2025-02-26 NOTE — PATIENT INSTRUCTIONS
Here for physical.      For Dm2 - A1c is 5.5. Tolerating mounjaro.  Continue with exercise.  Start resistance training.     For cholesterol - doing well with simvastatin.      For immunizations - you can get the following at the pharmacy:  Tdap (tetanus) and Prevnar 20 (pneumonia).  Check on 2nd shingles shot.      Follow up in 6 months.

## 2025-03-04 ENCOUNTER — APPOINTMENT (OUTPATIENT)
Dept: UROLOGY | Facility: CLINIC | Age: 65
End: 2025-03-04
Payer: COMMERCIAL

## 2025-03-04 DIAGNOSIS — R33.9 URINARY RETENTION: ICD-10-CM

## 2025-03-04 DIAGNOSIS — R39.9 URINARY SYMPTOM OR SIGN: ICD-10-CM

## 2025-03-04 DIAGNOSIS — N52.8 OTHER MALE ERECTILE DYSFUNCTION: ICD-10-CM

## 2025-03-04 DIAGNOSIS — R35.0 BENIGN PROSTATIC HYPERPLASIA WITH URINARY FREQUENCY: Primary | ICD-10-CM

## 2025-03-04 DIAGNOSIS — N40.1 BENIGN PROSTATIC HYPERPLASIA WITH URINARY FREQUENCY: Primary | ICD-10-CM

## 2025-03-04 PROCEDURE — 1036F TOBACCO NON-USER: CPT | Performed by: UROLOGY

## 2025-03-04 PROCEDURE — G2211 COMPLEX E/M VISIT ADD ON: HCPCS | Performed by: UROLOGY

## 2025-03-04 PROCEDURE — 99204 OFFICE O/P NEW MOD 45 MIN: CPT | Performed by: UROLOGY

## 2025-03-04 RX ORDER — TAMSULOSIN HYDROCHLORIDE 0.4 MG/1
0.4 CAPSULE ORAL NIGHTLY
Qty: 30 CAPSULE | Refills: 11 | Status: SHIPPED | OUTPATIENT
Start: 2025-03-04 | End: 2026-02-27

## 2025-03-04 NOTE — PROGRESS NOTES
Patient is a 64 y.o. male presenting with urinary urgency with incontinence.    SUBJECTIVE:  HPI   He presents as a new patient.   He has history of BPH with lower urinary tract symptoms.   He reports urinary urgency with incontinence and occasional nocturnal incontinence over the past 1 year.  He also has a weak stream.     Past Medical History:   Diagnosis Date    Allergic 1960    Arthritis     Diabetes mellitus (Multi)     Eating disorder     HL (hearing loss)     Personal history of other endocrine, nutritional and metabolic disease 03/01/2019    History of diabetes mellitus    Personal history of other endocrine, nutritional and metabolic disease 03/01/2019    History of hyperlipidemia     Past Surgical History:   Procedure Laterality Date    ABDOMINAL SURGERY      BACK SURGERY  2000    CIRCUMCISION, PRIMARY  1960    FOOT Right 10/15/2024    HERNIA REPAIR      OTHER SURGICAL HISTORY  03/01/2019    Back surgery    SPINE SURGERY      TONSILLECTOMY      US GUIDED PERCUTANEOUS PLACEMENT  11/30/2023    US GUIDED PERCUTANEOUS PLACEMENT 11/30/2023 [facility]      Family History   Problem Relation Name Age of Onset    Hyperlipidemia Mother Radha     Hypertension Mother Radha     Stroke Mother Radha         CVA's    Skin cancer Mother Radha     Arthritis Mother Radha     Cancer Mother Radha     Hearing loss Mother Radha     Miscarriages / Stillbirths Mother Radha     Vision loss Mother Radha     Diabetes Father David     Heart disease Father David     Leukemia Father David         Acute myeloid leukemia    Arthritis Father David     Hypertension Sister Kathe     Hyperlipidemia Sister Kathe     Diabetes Sister Kathe     Obesity Sister Kathe       Tobacco Use: Medium Risk (3/4/2025)    Patient History     Smoking Tobacco Use: Former     Smokeless Tobacco Use: Never     Passive Exposure: Not on file     Review of Systems   Constitutional: denies any unintentional weight loss or change in  "strength.  Integumentary: denies any rashes or pruritus.  Eyes: denies any double vision or eye pain.  Ear/Nose/Mouth/Throat: denies any nosebleeds or gum bleeds.  Cardiovascular: denies any chest pain or syncope.  Respiratory: denies hemoptysis.  Gastrointestinal: denies nausea or vomiting.  Musculoskeletal: denies muscle cramping or weakness.  Neurologic: denies convulsions or seizures.  Hematologic/Lymphatic: denies bleeding tendencies.  Endocrine: denies heat/cold intolerance.  All other systems have been reviewed and are negative unless otherwise noted in the HPI.    OBJECTIVE:  There were no vitals taken for this visit.  Physical Exam   Constitutional: No obvious distress.  Eyes: Non-injected conjunctiva, sclera clear, EOMI.  Ears/Nose/Mouth/Throat: No obvious drainage per ears or nose.  Cardiovascular: Extremities are warm and well perfused. No edema, cyanosis or pallor.  Respiratory: No audible wheezing/stridor; respirations do not appear labored.  Gastrointestinal: Abdomen soft, not distended.  Musculoskeletal: Normal ROM of extremities.  Skin: No obvious rashes or open sores.  Neurologic: Alert and oriented, CN 2-12 grossly intact.  Psychiatric: Answers questions appropriately with normal affect.  Hematologic/Lymphatic/Immunologic: No obvious bruises or sites of spontaneous bleeding.  Genitourinary: No CVA tenderness, bladder not palpable. No scrotal masses or tenderness. CHARLEY: prostate is enlarged, no nodules or tenderness.     Labs:  Lab Results   Component Value Date    GLUCOSE 89 10/02/2024    CALCIUM 9.2 10/02/2024     10/02/2024    K 4.7 10/02/2024    CO2 27 10/02/2024     10/02/2024    BUN 20 10/02/2024    CREATININE 0.84 10/02/2024     PSA   Date Value   02/20/2023 2.79 ng/mL   04/01/2022 2.42 ng/mL   07/14/2018 2.4 NG/ML     No results found for: \"URINECULTURE\"   IMAGING:        PROCEDURES:  PVR 97 mL   3/4/25    ASSESSMENT/PLAN:  Problem List Items Addressed This Visit       Benign " prostatic hyperplasia with lower urinary tract symptoms - Primary    Relevant Medications    tamsulosin (Flomax) 0.4 mg 24 hr capsule    Other male erectile dysfunction    Urinary retention     Other Visit Diagnoses       Urinary symptom or sign        Relevant Orders    Measure post void residual (Completed)           He has history of BPH.  He has urinary urgency, frequency and nocturia. His residual is 97 mL today (3/4/25). He will start tamsulosin at bedtime. Adverse effects and dosage reviewed. He will follow up in 1 month for probable cystoscopy.     He has erectile dysfunction. We discussed PDE5 inhibitors. We will discuss treatment more indepth at his follow up visit in 1 month.    Urinary retention of 97 ml. Track residual.    PSA summary  02/20/23 2.79  04/01/22 2.42  07/14/18 2.4  He will have a PSA drawn soon.    All questions were answered to the patient’s satisfaction.  Patient agrees with the plan and wishes to proceed.  Follow-up will be scheduled appropriately.     Scribe Attestation  By signing my name below, I, Logan Lazcano,   attest that this documentation has been prepared under the direction and in the presence of Khari Long MD.

## 2025-03-06 LAB
ALBUMIN SERPL-MCNC: 4.8 G/DL (ref 3.6–5.1)
ALP SERPL-CCNC: 58 U/L (ref 35–144)
ALT SERPL-CCNC: 25 U/L (ref 9–46)
ANION GAP SERPL CALCULATED.4IONS-SCNC: 8 MMOL/L (CALC) (ref 7–17)
AST SERPL-CCNC: 19 U/L (ref 10–35)
BILIRUB SERPL-MCNC: 0.6 MG/DL (ref 0.2–1.2)
BUN SERPL-MCNC: 18 MG/DL (ref 7–25)
CALCIUM SERPL-MCNC: 9.5 MG/DL (ref 8.6–10.3)
CHLORIDE SERPL-SCNC: 104 MMOL/L (ref 98–110)
CHOLEST SERPL-MCNC: 178 MG/DL
CHOLEST/HDLC SERPL: 4.7 (CALC)
CO2 SERPL-SCNC: 27 MMOL/L (ref 20–32)
CREAT SERPL-MCNC: 0.7 MG/DL (ref 0.7–1.35)
EGFRCR SERPLBLD CKD-EPI 2021: 103 ML/MIN/1.73M2
GLUCOSE SERPL-MCNC: 100 MG/DL (ref 65–99)
HDLC SERPL-MCNC: 38 MG/DL
LDLC SERPL CALC-MCNC: 100 MG/DL (CALC)
NONHDLC SERPL-MCNC: 140 MG/DL (CALC)
POTASSIUM SERPL-SCNC: 4.5 MMOL/L (ref 3.5–5.3)
PROT SERPL-MCNC: 7.4 G/DL (ref 6.1–8.1)
PSA SERPL-MCNC: 3.49 NG/ML
SODIUM SERPL-SCNC: 139 MMOL/L (ref 135–146)
TRIGL SERPL-MCNC: 283 MG/DL

## 2025-03-26 ENCOUNTER — PATIENT MESSAGE (OUTPATIENT)
Dept: PRIMARY CARE | Facility: CLINIC | Age: 65
End: 2025-03-26
Payer: COMMERCIAL

## 2025-03-26 ENCOUNTER — HOSPITAL ENCOUNTER (OUTPATIENT)
Dept: RADIOLOGY | Facility: HOSPITAL | Age: 65
Discharge: HOME | End: 2025-03-26
Payer: COMMERCIAL

## 2025-03-26 DIAGNOSIS — E78.2 COMBINED HYPERLIPIDEMIA: Primary | ICD-10-CM

## 2025-03-26 DIAGNOSIS — Z02.89 ENCOUNTER FOR FITNESS FOR DUTY EXAMINATION: ICD-10-CM

## 2025-03-26 PROCEDURE — 75571 CT HRT W/O DYE W/CA TEST: CPT

## 2025-03-31 RX ORDER — SIMVASTATIN 20 MG/1
20 TABLET, FILM COATED ORAL NIGHTLY
Qty: 90 TABLET | Refills: 3 | Status: SHIPPED | OUTPATIENT
Start: 2025-03-31 | End: 2026-03-31

## 2025-03-31 NOTE — PROGRESS NOTES
Patient is a 64 y.o. male presenting for follow up with [facility] of urinary urgency with incontinence.    SUBJECTIVE:  HPI   He presents for 1-month followup  He has history of BPH with lower urinary tract symptoms.   He reports improved urinary urgency and improved stream since starting tamsulosin on 3/4/25.  He has elected to hold off on cystoscopy today.    He reports urinary urgency with incontinence and occasional nocturnal incontinence over the past 1 year.  He also has a weak stream.     He has ED. He wonders if other medical issues are causing his erectile dysfunction.   He does not take nitroglycerine.    Past Medical History:   Diagnosis Date    Allergic 1960    Arthritis     Diabetes mellitus (Multi)     Eating disorder     HL (hearing loss)     Personal history of other endocrine, nutritional and metabolic disease 03/01/2019    History of diabetes mellitus    Personal history of other endocrine, nutritional and metabolic disease 03/01/2019    History of hyperlipidemia     Past Surgical History:   Procedure Laterality Date    ABDOMINAL SURGERY      BACK SURGERY  2000    CIRCUMCISION, PRIMARY  1960    FOOT Right 10/15/2024    HERNIA REPAIR      OTHER SURGICAL HISTORY  03/01/2019    Back surgery    SPINE SURGERY      TONSILLECTOMY      US GUIDED PERCUTANEOUS PLACEMENT  11/30/2023    US GUIDED PERCUTANEOUS PLACEMENT 11/30/2023 [facility]     Review of Systems     OBJECTIVE:  There were no vitals taken for this visit.  Physical Exam   Constitutional: No obvious distress.  Eyes: Non-injected conjunctiva, sclera clear, EOMI.  Ears/Nose/Mouth/Throat: No obvious drainage per ears or nose.  Cardiovascular: Extremities are warm and well perfused. No edema, cyanosis or pallor.  Respiratory: No audible wheezing/stridor; respirations do not appear labored.  Gastrointestinal: Abdomen soft, not distended.  Musculoskeletal: Normal ROM of extremities.  Skin: No obvious rashes or open sores.  Neurologic: Alert and oriented,  CN 2-12 grossly intact.  Psychiatric: Answers questions appropriately with normal affect.  Hematologic/Lymphatic/Immunologic: No obvious bruises or sites of spontaneous bleeding.  Genitourinary: No CVA tenderness, bladder not palpable. No scrotal masses or tenderness. CHARLEY: prostate is enlarged, no nodules or tenderness.     Labs:  Lab Results   Component Value Date    GLUCOSE 100 (H) 03/06/2025    CALCIUM 9.5 03/06/2025     03/06/2025    K 4.5 03/06/2025    CO2 27 03/06/2025     03/06/2025    BUN 18 03/06/2025    CREATININE 0.70 03/06/2025    EGFR 103 03/06/2025    PSA 3.49 03/06/2025     IMAGING:      PROCEDURES:  PVR 95 mL   4/1/25    ASSESSMENT/PLAN:  Problem List Items Addressed This Visit       Benign prostatic hyperplasia with lower urinary tract symptoms    Other male erectile dysfunction    Urinary retention     Other Visit Diagnoses       Elevated PSA    -  Primary    Relevant Orders    PSA    Urinary symptom or sign        Erectile dysfunction, unspecified erectile dysfunction type        Relevant Medications    sildenafil (Viagra) 100 mg tablet            He has history of BPH.  He has urinary urgency, frequency and nocturia. He had improvement with tamsulosin at bedtime.     He has mild retention of 95 mL today (4/1/25). This will be tracked.     He has erectile dysfunction. He will start sildenafil 100 mg PRN to take 1 hour prior to intercourse. He was advised to avoid alcohol. He was advised to present to ED for priapism.    Urinary retention of 97 ml. Track residual.    He has been seen for prostate cancer screening. He will repeat PSA prior to follow up.  PSA summary  03/06/25 3.49  02/20/23 2.79  04/01/22 2.42  07/14/18 2.4    He was unable to provide a urine specimen today.    He will follow up in 6 months.       All questions were answered to the patient’s satisfaction.  Patient agrees with the plan and wishes to proceed.  Follow-up will be scheduled appropriately.     Logan  Attestation  By signing my name below, I, Logan Lazcano,   attest that this documentation has been prepared under the direction and in the presence of Khari Long MD.

## 2025-04-01 ENCOUNTER — APPOINTMENT (OUTPATIENT)
Dept: UROLOGY | Facility: CLINIC | Age: 65
End: 2025-04-01
Payer: COMMERCIAL

## 2025-04-01 DIAGNOSIS — R33.9 URINARY RETENTION: ICD-10-CM

## 2025-04-01 DIAGNOSIS — N52.9 ERECTILE DYSFUNCTION, UNSPECIFIED ERECTILE DYSFUNCTION TYPE: ICD-10-CM

## 2025-04-01 DIAGNOSIS — R39.9 URINARY SYMPTOM OR SIGN: ICD-10-CM

## 2025-04-01 DIAGNOSIS — N52.8 OTHER MALE ERECTILE DYSFUNCTION: ICD-10-CM

## 2025-04-01 DIAGNOSIS — N40.1 BENIGN PROSTATIC HYPERPLASIA WITH WEAK URINARY STREAM: ICD-10-CM

## 2025-04-01 DIAGNOSIS — R97.20 ELEVATED PSA: Primary | ICD-10-CM

## 2025-04-01 DIAGNOSIS — R39.12 BENIGN PROSTATIC HYPERPLASIA WITH WEAK URINARY STREAM: ICD-10-CM

## 2025-04-01 PROCEDURE — G2211 COMPLEX E/M VISIT ADD ON: HCPCS | Performed by: UROLOGY

## 2025-04-01 PROCEDURE — 99214 OFFICE O/P EST MOD 30 MIN: CPT | Performed by: UROLOGY

## 2025-04-01 RX ORDER — SILDENAFIL 100 MG/1
100 TABLET, FILM COATED ORAL AS NEEDED
Qty: 8 TABLET | Refills: 5 | Status: SHIPPED | OUTPATIENT
Start: 2025-04-01 | End: 2025-05-01

## 2025-04-02 DIAGNOSIS — N40.1 BENIGN PROSTATIC HYPERPLASIA WITH URINARY FREQUENCY: ICD-10-CM

## 2025-04-02 DIAGNOSIS — R35.0 BENIGN PROSTATIC HYPERPLASIA WITH URINARY FREQUENCY: ICD-10-CM

## 2025-04-02 RX ORDER — TAMSULOSIN HYDROCHLORIDE 0.4 MG/1
0.4 CAPSULE ORAL NIGHTLY
Qty: 90 CAPSULE | Refills: 3 | Status: SHIPPED | OUTPATIENT
Start: 2025-04-02 | End: 2026-03-28

## 2025-06-30 DIAGNOSIS — E78.2 COMBINED HYPERLIPIDEMIA: ICD-10-CM

## 2025-08-08 ENCOUNTER — PATIENT MESSAGE (OUTPATIENT)
Dept: PRIMARY CARE | Facility: CLINIC | Age: 65
End: 2025-08-08
Payer: COMMERCIAL

## 2025-08-08 DIAGNOSIS — I10 BENIGN HYPERTENSION: ICD-10-CM

## 2025-08-08 DIAGNOSIS — Z12.5 SCREENING FOR PROSTATE CANCER: ICD-10-CM

## 2025-08-08 DIAGNOSIS — E11.9 TYPE 2 DIABETES MELLITUS WITHOUT COMPLICATION, WITHOUT LONG-TERM CURRENT USE OF INSULIN: Primary | ICD-10-CM

## 2025-08-08 DIAGNOSIS — E78.2 COMBINED HYPERLIPIDEMIA: ICD-10-CM

## 2025-08-12 ENCOUNTER — PATIENT MESSAGE (OUTPATIENT)
Dept: PRIMARY CARE | Facility: CLINIC | Age: 65
End: 2025-08-12
Payer: COMMERCIAL

## 2025-08-15 ENCOUNTER — OFFICE VISIT (OUTPATIENT)
Dept: PRIMARY CARE | Facility: CLINIC | Age: 65
End: 2025-08-15
Payer: COMMERCIAL

## 2025-08-15 ENCOUNTER — APPOINTMENT (OUTPATIENT)
Dept: OTOLARYNGOLOGY | Facility: CLINIC | Age: 65
End: 2025-08-15
Payer: COMMERCIAL

## 2025-08-15 VITALS
WEIGHT: 230 LBS | BODY MASS INDEX: 30.34 KG/M2 | SYSTOLIC BLOOD PRESSURE: 132 MMHG | DIASTOLIC BLOOD PRESSURE: 80 MMHG | OXYGEN SATURATION: 97 % | HEART RATE: 78 BPM | TEMPERATURE: 98.1 F

## 2025-08-15 VITALS — BODY MASS INDEX: 30.48 KG/M2 | WEIGHT: 230 LBS | HEIGHT: 73 IN

## 2025-08-15 DIAGNOSIS — J31.0 RHINITIS MEDICAMENTOSA: ICD-10-CM

## 2025-08-15 DIAGNOSIS — J34.2 DEVIATED NASAL SEPTUM: ICD-10-CM

## 2025-08-15 DIAGNOSIS — T48.5X5A RHINITIS MEDICAMENTOSA: ICD-10-CM

## 2025-08-15 DIAGNOSIS — J32.9 CHRONIC SINUSITIS, UNSPECIFIED LOCATION: ICD-10-CM

## 2025-08-15 DIAGNOSIS — I10 BENIGN HYPERTENSION: ICD-10-CM

## 2025-08-15 DIAGNOSIS — J00 ACUTE RHINITIS: ICD-10-CM

## 2025-08-15 DIAGNOSIS — G50.1 ATYPICAL FACIAL PAIN: ICD-10-CM

## 2025-08-15 DIAGNOSIS — J32.4 CHRONIC PANSINUSITIS: ICD-10-CM

## 2025-08-15 DIAGNOSIS — J34.3 NASAL TURBINATE HYPERTROPHY: ICD-10-CM

## 2025-08-15 DIAGNOSIS — J30.9 ALLERGIC RHINITIS, UNSPECIFIED SEASONALITY, UNSPECIFIED TRIGGER: Primary | ICD-10-CM

## 2025-08-15 DIAGNOSIS — E78.1 HYPERTRIGLYCERIDEMIA: ICD-10-CM

## 2025-08-15 DIAGNOSIS — E11.9 TYPE 2 DIABETES MELLITUS WITHOUT COMPLICATION, WITHOUT LONG-TERM CURRENT USE OF INSULIN: Primary | ICD-10-CM

## 2025-08-15 LAB
ALBUMIN SERPL-MCNC: 4.9 G/DL (ref 3.6–5.1)
ALP SERPL-CCNC: 65 U/L (ref 35–144)
ALT SERPL-CCNC: 24 U/L (ref 9–46)
ANION GAP SERPL CALCULATED.4IONS-SCNC: 8 MMOL/L (CALC) (ref 7–17)
AST SERPL-CCNC: 18 U/L (ref 10–35)
BASOPHILS # BLD AUTO: 78 CELLS/UL (ref 0–200)
BASOPHILS NFR BLD AUTO: 1.4 %
BILIRUB SERPL-MCNC: 0.5 MG/DL (ref 0.2–1.2)
BUN SERPL-MCNC: 18 MG/DL (ref 7–25)
CALCIUM SERPL-MCNC: 9.8 MG/DL (ref 8.6–10.3)
CHLORIDE SERPL-SCNC: 103 MMOL/L (ref 98–110)
CHOLEST SERPL-MCNC: 198 MG/DL
CHOLEST/HDLC SERPL: 4.8 (CALC)
CO2 SERPL-SCNC: 28 MMOL/L (ref 20–32)
CREAT SERPL-MCNC: 0.8 MG/DL (ref 0.7–1.35)
EGFRCR SERPLBLD CKD-EPI 2021: 99 ML/MIN/1.73M2
EOSINOPHIL # BLD AUTO: 258 CELLS/UL (ref 15–500)
EOSINOPHIL NFR BLD AUTO: 4.6 %
ERYTHROCYTE [DISTWIDTH] IN BLOOD BY AUTOMATED COUNT: 12.9 % (ref 11–15)
EST. AVERAGE GLUCOSE BLD GHB EST-MCNC: 134 MG/DL
EST. AVERAGE GLUCOSE BLD GHB EST-SCNC: 7.4 MMOL/L
GLUCOSE SERPL-MCNC: 133 MG/DL (ref 65–99)
HBA1C MFR BLD: 6.3 %
HCT VFR BLD AUTO: 44.5 % (ref 38.5–50)
HDLC SERPL-MCNC: 41 MG/DL
HGB BLD-MCNC: 14.4 G/DL (ref 13.2–17.1)
LDLC SERPL CALC-MCNC: 114 MG/DL (CALC)
LPA SERPL-SCNC: 16 NMOL/L
LYMPHOCYTES # BLD AUTO: 2066 CELLS/UL (ref 850–3900)
LYMPHOCYTES NFR BLD AUTO: 36.9 %
MCH RBC QN AUTO: 30.1 PG (ref 27–33)
MCHC RBC AUTO-ENTMCNC: 32.4 G/DL (ref 32–36)
MCV RBC AUTO: 92.9 FL (ref 80–100)
MONOCYTES # BLD AUTO: 498 CELLS/UL (ref 200–950)
MONOCYTES NFR BLD AUTO: 8.9 %
NEUTROPHILS # BLD AUTO: 2699 CELLS/UL (ref 1500–7800)
NEUTROPHILS NFR BLD AUTO: 48.2 %
NONHDLC SERPL-MCNC: 157 MG/DL (CALC)
PLATELET # BLD AUTO: 263 THOUSAND/UL (ref 140–400)
PMV BLD REES-ECKER: 9.6 FL (ref 7.5–12.5)
POTASSIUM SERPL-SCNC: 4.9 MMOL/L (ref 3.5–5.3)
PROT SERPL-MCNC: 7.5 G/DL (ref 6.1–8.1)
PSA SERPL-MCNC: 3.27 NG/ML
RBC # BLD AUTO: 4.79 MILLION/UL (ref 4.2–5.8)
SODIUM SERPL-SCNC: 139 MMOL/L (ref 135–146)
TRIGL SERPL-MCNC: 323 MG/DL
WBC # BLD AUTO: 5.6 THOUSAND/UL (ref 3.8–10.8)

## 2025-08-15 PROCEDURE — 3008F BODY MASS INDEX DOCD: CPT | Performed by: OTOLARYNGOLOGY

## 2025-08-15 PROCEDURE — 3044F HG A1C LEVEL LT 7.0%: CPT | Performed by: OTOLARYNGOLOGY

## 2025-08-15 PROCEDURE — 99214 OFFICE O/P EST MOD 30 MIN: CPT | Performed by: FAMILY MEDICINE

## 2025-08-15 PROCEDURE — 31231 NASAL ENDOSCOPY DX: CPT | Performed by: OTOLARYNGOLOGY

## 2025-08-15 PROCEDURE — 99203 OFFICE O/P NEW LOW 30 MIN: CPT | Performed by: OTOLARYNGOLOGY

## 2025-08-15 PROCEDURE — 3075F SYST BP GE 130 - 139MM HG: CPT | Performed by: FAMILY MEDICINE

## 2025-08-15 PROCEDURE — 3044F HG A1C LEVEL LT 7.0%: CPT | Performed by: FAMILY MEDICINE

## 2025-08-15 PROCEDURE — 3079F DIAST BP 80-89 MM HG: CPT | Performed by: FAMILY MEDICINE

## 2025-08-15 RX ORDER — FLUTICASONE PROPIONATE 50 MCG
SPRAY, SUSPENSION (ML) NASAL
Qty: 1 ML | Refills: 11 | Status: SHIPPED | OUTPATIENT
Start: 2025-08-15

## 2025-08-15 ASSESSMENT — LIFESTYLE VARIABLES
HOW OFTEN DO YOU HAVE SIX OR MORE DRINKS ON ONE OCCASION: NEVER
SKIP TO QUESTIONS 9-10: 1
HOW OFTEN DO YOU HAVE A DRINK CONTAINING ALCOHOL: 2-3 TIMES A WEEK
HOW MANY STANDARD DRINKS CONTAINING ALCOHOL DO YOU HAVE ON A TYPICAL DAY: 1 OR 2
AUDIT-C TOTAL SCORE: 3

## 2025-08-15 ASSESSMENT — PAIN SCALES - GENERAL: PAINLEVEL_OUTOF10: 0-NO PAIN

## 2025-08-15 ASSESSMENT — ENCOUNTER SYMPTOMS
OCCASIONAL FEELINGS OF UNSTEADINESS: 0
LOSS OF SENSATION IN FEET: 0
DEPRESSION: 0

## 2025-08-28 DIAGNOSIS — M25.511 RIGHT SHOULDER PAIN, UNSPECIFIED CHRONICITY: Primary | ICD-10-CM

## 2025-09-03 ENCOUNTER — OFFICE VISIT (OUTPATIENT)
Dept: PAIN MEDICINE | Facility: CLINIC | Age: 65
End: 2025-09-03
Payer: COMMERCIAL

## 2025-09-03 VITALS — HEART RATE: 79 BPM | OXYGEN SATURATION: 99 % | DIASTOLIC BLOOD PRESSURE: 60 MMHG | SYSTOLIC BLOOD PRESSURE: 116 MMHG

## 2025-09-03 DIAGNOSIS — M54.2 CERVICALGIA: ICD-10-CM

## 2025-09-03 DIAGNOSIS — M25.511 CHRONIC PAIN OF BOTH SHOULDERS: Primary | ICD-10-CM

## 2025-09-03 DIAGNOSIS — M19.012 LOCALIZED PRIMARY OSTEOARTHRITIS OF SHOULDER REGIONS, BILATERAL: ICD-10-CM

## 2025-09-03 DIAGNOSIS — M75.102 BILATERAL ROTATOR CUFF SYNDROME: ICD-10-CM

## 2025-09-03 DIAGNOSIS — M25.561 CHRONIC PAIN OF RIGHT KNEE: ICD-10-CM

## 2025-09-03 DIAGNOSIS — G89.29 CHRONIC PAIN OF BOTH SHOULDERS: Primary | ICD-10-CM

## 2025-09-03 DIAGNOSIS — M96.1 LUMBAR POSTLAMINECTOMY SYNDROME: ICD-10-CM

## 2025-09-03 DIAGNOSIS — G89.29 CHRONIC PAIN OF RIGHT KNEE: ICD-10-CM

## 2025-09-03 DIAGNOSIS — M19.011 LOCALIZED PRIMARY OSTEOARTHRITIS OF SHOULDER REGIONS, BILATERAL: ICD-10-CM

## 2025-09-03 DIAGNOSIS — M75.101 BILATERAL ROTATOR CUFF SYNDROME: ICD-10-CM

## 2025-09-03 DIAGNOSIS — M25.512 CHRONIC PAIN OF BOTH SHOULDERS: Primary | ICD-10-CM

## 2025-09-03 DIAGNOSIS — G57.11 MERALGIA PARESTHETICA OF RIGHT SIDE: ICD-10-CM

## 2025-09-03 PROCEDURE — 20611 DRAIN/INJ JOINT/BURSA W/US: CPT | Performed by: ANESTHESIOLOGY

## 2025-09-03 PROCEDURE — 1036F TOBACCO NON-USER: CPT | Performed by: ANESTHESIOLOGY

## 2025-09-03 PROCEDURE — 3044F HG A1C LEVEL LT 7.0%: CPT | Performed by: ANESTHESIOLOGY

## 2025-09-03 PROCEDURE — 3074F SYST BP LT 130 MM HG: CPT | Performed by: ANESTHESIOLOGY

## 2025-09-03 PROCEDURE — 99214 OFFICE O/P EST MOD 30 MIN: CPT | Performed by: ANESTHESIOLOGY

## 2025-09-03 PROCEDURE — 3078F DIAST BP <80 MM HG: CPT | Performed by: ANESTHESIOLOGY

## 2025-09-03 PROCEDURE — 2500000004 HC RX 250 GENERAL PHARMACY W/ HCPCS (ALT 636 FOR OP/ED): Performed by: ANESTHESIOLOGY

## 2025-09-03 PROCEDURE — 99213 OFFICE O/P EST LOW 20 MIN: CPT | Mod: 25

## 2025-09-03 RX ORDER — BUPIVACAINE HYDROCHLORIDE 5 MG/ML
2 INJECTION, SOLUTION EPIDURAL; INTRACAUDAL; PERINEURAL ONCE
Status: COMPLETED | OUTPATIENT
Start: 2025-09-03 | End: 2025-09-03

## 2025-09-03 RX ORDER — TRIAMCINOLONE ACETONIDE 40 MG/ML
40 INJECTION, SUSPENSION INTRA-ARTICULAR; INTRAMUSCULAR ONCE
Status: COMPLETED | OUTPATIENT
Start: 2025-09-03 | End: 2025-09-03

## 2025-09-03 RX ADMIN — BUPIVACAINE HYDROCHLORIDE 10 MG: 5 INJECTION, SOLUTION EPIDURAL; INTRACAUDAL; PERINEURAL at 10:49

## 2025-09-03 RX ADMIN — TRIAMCINOLONE ACETONIDE 40 MG: 40 INJECTION, SUSPENSION INTRA-ARTICULAR; INTRAMUSCULAR at 10:50

## 2025-09-03 ASSESSMENT — ENCOUNTER SYMPTOMS
WEAKNESS: 1
PSYCHIATRIC NEGATIVE: 1
EYES NEGATIVE: 1
NUMBNESS: 1
DEPRESSION: 0
CARDIOVASCULAR NEGATIVE: 1
ENDOCRINE NEGATIVE: 1
BACK PAIN: 1
OCCASIONAL FEELINGS OF UNSTEADINESS: 0
RESPIRATORY NEGATIVE: 1
MYALGIAS: 1
NECK STIFFNESS: 1
CONSTITUTIONAL NEGATIVE: 1
LOSS OF SENSATION IN FEET: 0
HEMATOLOGIC/LYMPHATIC NEGATIVE: 1
GASTROINTESTINAL NEGATIVE: 1

## 2025-09-03 ASSESSMENT — PAIN SCALES - GENERAL
PAINLEVEL_OUTOF10: 5
PAINLEVEL_OUTOF10: 5 - MODERATE PAIN

## 2025-09-03 ASSESSMENT — PAIN - FUNCTIONAL ASSESSMENT: PAIN_FUNCTIONAL_ASSESSMENT: 0-10

## 2025-09-03 ASSESSMENT — PAIN DESCRIPTION - DESCRIPTORS: DESCRIPTORS: ACHING

## 2025-09-04 ENCOUNTER — APPOINTMENT (OUTPATIENT)
Dept: RADIOLOGY | Facility: HOSPITAL | Age: 65
End: 2025-09-04
Payer: COMMERCIAL

## 2025-09-22 ENCOUNTER — APPOINTMENT (OUTPATIENT)
Dept: PAIN MEDICINE | Facility: CLINIC | Age: 65
End: 2025-09-22
Payer: COMMERCIAL